# Patient Record
Sex: MALE | Race: WHITE | NOT HISPANIC OR LATINO | Employment: OTHER
[De-identification: names, ages, dates, MRNs, and addresses within clinical notes are randomized per-mention and may not be internally consistent; named-entity substitution may affect disease eponyms.]

---

## 2023-01-01 ENCOUNTER — HEALTH MAINTENANCE LETTER (OUTPATIENT)
Age: 88
End: 2023-01-01

## 2023-01-01 ENCOUNTER — DOCUMENTATION ONLY (OUTPATIENT)
Dept: OTHER | Facility: CLINIC | Age: 88
End: 2023-01-01
Payer: MEDICARE

## 2023-01-01 ENCOUNTER — ASSISTED LIVING VISIT (OUTPATIENT)
Dept: GERIATRICS | Facility: CLINIC | Age: 88
End: 2023-01-01
Payer: MEDICARE

## 2023-01-01 ENCOUNTER — OFFICE VISIT (OUTPATIENT)
Dept: AUDIOLOGY | Facility: CLINIC | Age: 88
End: 2023-01-01
Attending: NURSE PRACTITIONER
Payer: MEDICARE

## 2023-01-01 ENCOUNTER — LAB REQUISITION (OUTPATIENT)
Dept: LAB | Facility: CLINIC | Age: 88
End: 2023-01-01
Payer: MEDICARE

## 2023-01-01 VITALS
WEIGHT: 195 LBS | HEART RATE: 65 BPM | SYSTOLIC BLOOD PRESSURE: 130 MMHG | OXYGEN SATURATION: 97 % | DIASTOLIC BLOOD PRESSURE: 75 MMHG | RESPIRATION RATE: 20 BRPM | TEMPERATURE: 98 F

## 2023-01-01 VITALS
SYSTOLIC BLOOD PRESSURE: 163 MMHG | TEMPERATURE: 98.4 F | HEART RATE: 87 BPM | BODY MASS INDEX: 25.84 KG/M2 | OXYGEN SATURATION: 97 % | RESPIRATION RATE: 16 BRPM | DIASTOLIC BLOOD PRESSURE: 72 MMHG | HEIGHT: 73 IN | WEIGHT: 195 LBS

## 2023-01-01 DIAGNOSIS — R53.81 PHYSICAL DECONDITIONING: ICD-10-CM

## 2023-01-01 DIAGNOSIS — R53.1 WEAKNESS: ICD-10-CM

## 2023-01-01 DIAGNOSIS — N40.0 BENIGN PROSTATIC HYPERPLASIA WITHOUT URINARY OBSTRUCTION: ICD-10-CM

## 2023-01-01 DIAGNOSIS — E11.9 TYPE 2 DIABETES MELLITUS WITHOUT COMPLICATION, WITHOUT LONG-TERM CURRENT USE OF INSULIN (H): ICD-10-CM

## 2023-01-01 DIAGNOSIS — F03.B0 MODERATE DEMENTIA WITHOUT BEHAVIORAL DISTURBANCE, PSYCHOTIC DISTURBANCE, MOOD DISTURBANCE, OR ANXIETY, UNSPECIFIED DEMENTIA TYPE (H): Primary | ICD-10-CM

## 2023-01-01 DIAGNOSIS — C44.42 SQUAMOUS CELL CARCINOMA OF SCALP: ICD-10-CM

## 2023-01-01 DIAGNOSIS — H90.3 SENSORINEURAL HEARING LOSS (SNHL) OF BOTH EARS: Primary | ICD-10-CM

## 2023-01-01 DIAGNOSIS — D69.6 THROMBOCYTOPENIA (H): ICD-10-CM

## 2023-01-01 DIAGNOSIS — E11.9 TYPE 2 DIABETES MELLITUS WITHOUT COMPLICATIONS (H): ICD-10-CM

## 2023-01-01 DIAGNOSIS — R41.89 COGNITIVE IMPAIRMENT: ICD-10-CM

## 2023-01-01 DIAGNOSIS — Z86.39 PERSONAL HISTORY OF OTHER ENDOCRINE, NUTRITIONAL AND METABOLIC DISEASE: ICD-10-CM

## 2023-01-01 DIAGNOSIS — Z23 NEED FOR PNEUMOCOCCAL VACCINE: ICD-10-CM

## 2023-01-01 DIAGNOSIS — C44.42 SQUAMOUS CELL CARCINOMA OF SCALP: Primary | ICD-10-CM

## 2023-01-01 DIAGNOSIS — I10 PRIMARY HYPERTENSION: ICD-10-CM

## 2023-01-01 DIAGNOSIS — H91.93 BILATERAL HEARING LOSS, UNSPECIFIED HEARING LOSS TYPE: ICD-10-CM

## 2023-01-01 LAB
ANION GAP SERPL CALCULATED.3IONS-SCNC: 13 MMOL/L (ref 7–15)
BASOPHILS # BLD AUTO: 0.1 10E3/UL (ref 0–0.2)
BASOPHILS NFR BLD AUTO: 1 %
BUN SERPL-MCNC: 20.6 MG/DL (ref 8–23)
CALCIUM SERPL-MCNC: 9.1 MG/DL (ref 8.2–9.6)
CHLORIDE SERPL-SCNC: 102 MMOL/L (ref 98–107)
CREAT SERPL-MCNC: 1.06 MG/DL (ref 0.67–1.17)
DEPRECATED HCO3 PLAS-SCNC: 24 MMOL/L (ref 22–29)
EGFRCR SERPLBLD CKD-EPI 2021: 67 ML/MIN/1.73M2
EOSINOPHIL # BLD AUTO: 0.4 10E3/UL (ref 0–0.7)
EOSINOPHIL NFR BLD AUTO: 4 %
ERYTHROCYTE [DISTWIDTH] IN BLOOD BY AUTOMATED COUNT: 14.5 % (ref 10–15)
GLUCOSE SERPL-MCNC: 212 MG/DL (ref 70–99)
HBA1C MFR BLD: 7.7 %
HCT VFR BLD AUTO: 35 % (ref 40–53)
HGB BLD-MCNC: 11.6 G/DL (ref 13.3–17.7)
IMM GRANULOCYTES # BLD: 0.1 10E3/UL
IMM GRANULOCYTES NFR BLD: 1 %
LYMPHOCYTES # BLD AUTO: 3.5 10E3/UL (ref 0.8–5.3)
LYMPHOCYTES NFR BLD AUTO: 40 %
MCH RBC QN AUTO: 33 PG (ref 26.5–33)
MCHC RBC AUTO-ENTMCNC: 33.1 G/DL (ref 31.5–36.5)
MCV RBC AUTO: 99 FL (ref 78–100)
MONOCYTES # BLD AUTO: 0.8 10E3/UL (ref 0–1.3)
MONOCYTES NFR BLD AUTO: 9 %
NEUTROPHILS # BLD AUTO: 3.8 10E3/UL (ref 1.6–8.3)
NEUTROPHILS NFR BLD AUTO: 45 %
NRBC # BLD AUTO: 0 10E3/UL
NRBC BLD AUTO-RTO: 0 /100
PLATELET # BLD AUTO: 139 10E3/UL (ref 150–450)
POTASSIUM SERPL-SCNC: 4.2 MMOL/L (ref 3.4–5.3)
RBC # BLD AUTO: 3.52 10E6/UL (ref 4.4–5.9)
SODIUM SERPL-SCNC: 139 MMOL/L (ref 135–145)
WBC # BLD AUTO: 8.6 10E3/UL (ref 4–11)

## 2023-01-01 PROCEDURE — 83036 HEMOGLOBIN GLYCOSYLATED A1C: CPT | Mod: ORL | Performed by: NURSE PRACTITIONER

## 2023-01-01 PROCEDURE — 92567 TYMPANOMETRY: CPT | Performed by: AUDIOLOGIST

## 2023-01-01 PROCEDURE — 36415 COLL VENOUS BLD VENIPUNCTURE: CPT | Mod: ORL | Performed by: NURSE PRACTITIONER

## 2023-01-01 PROCEDURE — 80048 BASIC METABOLIC PNL TOTAL CA: CPT | Mod: ORL | Performed by: NURSE PRACTITIONER

## 2023-01-01 PROCEDURE — 99349 HOME/RES VST EST MOD MDM 40: CPT | Performed by: NURSE PRACTITIONER

## 2023-01-01 PROCEDURE — 85025 COMPLETE CBC W/AUTO DIFF WBC: CPT | Mod: ORL | Performed by: NURSE PRACTITIONER

## 2023-01-01 PROCEDURE — P9604 ONE-WAY ALLOW PRORATED TRIP: HCPCS | Mod: ORL | Performed by: NURSE PRACTITIONER

## 2023-01-01 PROCEDURE — 92557 COMPREHENSIVE HEARING TEST: CPT | Performed by: AUDIOLOGIST

## 2023-01-01 RX ORDER — IBUPROFEN 400 MG/1
400 TABLET, FILM COATED ORAL EVERY 6 HOURS PRN
Status: ON HOLD | COMMUNITY
End: 2024-01-01

## 2023-01-01 RX ORDER — NIACINAMIDE 500 MG
500 TABLET ORAL 2 TIMES DAILY
Status: ON HOLD | COMMUNITY
End: 2024-01-01

## 2023-05-17 ENCOUNTER — DOCUMENTATION ONLY (OUTPATIENT)
Dept: OTHER | Facility: CLINIC | Age: 88
End: 2023-05-17

## 2023-05-24 ENCOUNTER — DOCUMENTATION ONLY (OUTPATIENT)
Dept: GERIATRICS | Facility: CLINIC | Age: 88
End: 2023-05-24

## 2023-05-24 DIAGNOSIS — H91.93 BILATERAL HEARING LOSS, UNSPECIFIED HEARING LOSS TYPE: Primary | ICD-10-CM

## 2023-05-24 PROBLEM — Z91.81 HISTORY OF FALLING: Status: ACTIVE | Noted: 2020-11-02

## 2023-05-24 PROBLEM — Z85.828 HISTORY OF SQUAMOUS CELL CARCINOMA OF SKIN: Status: ACTIVE | Noted: 2018-11-06

## 2023-05-24 NOTE — PROGRESS NOTES
[facility]   May 24, 2023     Name: Nick Cameron   : 1933     Background:  Tammy (daughter) reports patient has chronic hearing loss, requesting order for Audiology referral.       Orders:    Referral for Audiologist  Dx bilateral hearing loss        Electronically signed by  EDIE Sibley CNP on 2023 at 2:59 PM

## 2023-05-31 ENCOUNTER — ASSISTED LIVING VISIT (OUTPATIENT)
Dept: GERIATRICS | Facility: CLINIC | Age: 88
End: 2023-05-31
Payer: MEDICARE

## 2023-05-31 VITALS
HEART RATE: 72 BPM | SYSTOLIC BLOOD PRESSURE: 126 MMHG | WEIGHT: 195 LBS | DIASTOLIC BLOOD PRESSURE: 65 MMHG | OXYGEN SATURATION: 96 % | RESPIRATION RATE: 16 BRPM | TEMPERATURE: 98 F

## 2023-05-31 DIAGNOSIS — D04.9 SQUAMOUS CELL CARCINOMA IN SITU (SCCIS) OF SKIN: ICD-10-CM

## 2023-05-31 DIAGNOSIS — E11.9 TYPE 2 DIABETES MELLITUS WITHOUT COMPLICATION, WITHOUT LONG-TERM CURRENT USE OF INSULIN (H): Primary | ICD-10-CM

## 2023-05-31 DIAGNOSIS — N40.0 BENIGN PROSTATIC HYPERPLASIA WITHOUT URINARY OBSTRUCTION: ICD-10-CM

## 2023-05-31 DIAGNOSIS — I10 PRIMARY HYPERTENSION: ICD-10-CM

## 2023-05-31 DIAGNOSIS — R53.81 PHYSICAL DECONDITIONING: ICD-10-CM

## 2023-05-31 DIAGNOSIS — R41.89 COGNITIVE IMPAIRMENT: ICD-10-CM

## 2023-05-31 DIAGNOSIS — L98.9 SCALP LESION: ICD-10-CM

## 2023-05-31 PROCEDURE — 99344 HOME/RES VST NEW MOD MDM 60: CPT | Performed by: NURSE PRACTITIONER

## 2023-05-31 NOTE — LETTER
5/31/2023        RE: Nick Cameron  [place] [facility]  [address]  [place] [address]        [facility] GERIATRICS    PRIMARY CARE PROVIDER AND CLINIC:  EDIE Sibley CNP, [address] / [place] [address]  Chief Complaint   Patient presents with     [facility] Medical Record Number:  [id number]  Place of Service where encounter took place:  [facility] ([facility]) [[id number]]    Nick Cameron  is a 89 year old  (7/30/1933), living in above facility since 5/12/23 and now choosing to change PCPs to [facility].     HPI:      PMH: diabetes, HTN, cognitive impairment, physical deconditioning, hx SCC to scalp s/p Moh's procedure    Resides in [facility] memory care unit w/spouse.       Today's concerns:    During exam, patient seen sitting in recliner in memory care apartment w/spouse present. Reports doing well, denies pain. States he's been dealing with scalp lesion for multiple years, chronic wound. Wears a cap to cover dressing to keep in place. States staff and daughter assisting with wound care. Primarily wheelchair bound, ambulates w/walker in apartment. Admits to good appetite. Sleeping well at night. Denies issues with bowel or bladder. Denies chest pain, SOB, headache, syncope.      CODE STATUS/ADVANCE DIRECTIVES DISCUSSION:   DNR / DNI  ALLERGIES: No Known Allergies   PAST MEDICAL HISTORY:   Past Medical History:   Diagnosis Date     DM2 (diabetes mellitus, type 2) (H)      HLD (hyperlipidemia)      HTN (hypertension)      Malignant neoplasm of skin       PAST SURGICAL HISTORY:   has a past surgical history that includes Cataract Extraction W/ Intraocular Lens Implant; Mohs micrographic procedure; and Repair MOHS.  FAMILY HISTORY: family history includes Bone Cancer in his father; Hypertension in his father and mother; Rheumatic fever in his mother.  SOCIAL HISTORY:   reports that he has quit smoking. His smoking use included pipe and  cigars. He has never used smokeless tobacco. He reports current alcohol use.  Patient's living condition: lives in an assisted living facility memory care unit w/spouse    Post Discharge Medication Reconciliation Status:   MED REC REQUIRED  Post Medication Reconciliation Status: patient was not discharged from an inpatient facility or TCU     Current Outpatient Medications   Medication Sig     acetaminophen (MAPAP) 500 MG CAPS Take 1-2 capsules by mouth every 6 hours as needed     aspirin (ASPIRIN 81) 81 MG chewable tablet Take 81 mg by mouth daily     atorvastatin (LIPITOR) 20 MG tablet Take 20 mg by mouth daily     diltiazem ER (DILT-XR) 120 MG 24 hr capsule Take 120 mg by mouth daily     Emollient (CERAVE) CREA Externally apply topically 2 times daily     fluocinonide (LIDEX) 0.05 % external cream Apply topically 2 times daily as needed (rash)     ketoconazole (NIZORAL) 2 % external shampoo Apply topically three times a week     metFORMIN (GLUCOPHAGE XR) 500 MG 24 hr tablet Take 1,000 mg by mouth daily (with dinner)     No current facility-administered medications for this visit.       ROS:  10 point ROS of systems including Constitutional, Eyes, Respiratory, Cardiovascular, Gastroenterology, Genitourinary, Integumentary, Musculoskeletal, Psychiatric were all negative except for pertinent positives noted in my HPI.      Vitals:  /65   Pulse 72   Temp 98  F (36.7  C)   Resp 16   Wt 88.5 kg (195 lb)   SpO2 96%   Exam:  GENERAL APPEARANCE:  Alert, in no distress, appears healthy, oriented, cooperative  ENT:  Mouth and posterior oropharynx normal, moist mucous membranes, [place]  EYES:  EOM, conjunctivae, lids, pupils and irises normal, PERRL  RESP:  respiratory effort and palpation of chest normal, lungs clear to auscultation , no respiratory distress  CV:  Palpation and auscultation of heart done , regular rate and rhythm, no murmur, rub, or gallop, no edema  ABDOMEN:  normal bowel sounds, soft, nontender,  no guarding or rebound  M/S:   Gait and station abnormal, sitting in chair.  SKIN:  Inspection of skin and subcutaneous tissue baseline, Palpation of skin and subcutaneous tissue baseline. Scalp wound dressing CDI, wearing cap to cover dressing  NEURO:   Cranial nerves 2-12 are normal tested and grossly at patient's baseline, Examination of sensation by touch normal  PSYCH:  Oriented x 2, memory impaired , affect and mood normal      Lab/Diagnostic data:  Recent labs in [facility] reviewed by me today.                ASSESSMENT/PLAN:    (E11.9) Type 2 diabetes mellitus without complication, without long-term current use of insulin (H)  (primary encounter diagnosis)  Comment: Controlled, last A1c 6.1% on 4/10/23. A1c goal 8% for older adults.  Plan:   - Recheck A1c  - Continue metformin, consider decreasing metformin if A1c < 7%    (I10) Primary hypertension  Comment: Controlled  Plan:   - Continue ASA, lipitor, diltiazem  - Monitor BP/HR during routine visits    (N40.0) Benign prostatic hyperplasia without urinary obstruction  Comment: Chronic BPH. Hx taking flomax, unclear why this was discontinued.   Plan:   - Monitor symptoms    (R53.81) Physical deconditioning  (R41.89) Cognitive impairment  Comment: Ongoing physical deconditioning w/cognitive impairment. Primarily wheelchair bound, ambulates short distances w/walker.   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   Plan:   - Continue wound care as directed by Dermatology  - Patient to follow-up with Dermatologist as directed  UPDATE: Reviewed patient status and treatment plan with Tammy (daughter)       Orders:  - Check CBC, CMP, A1c, TSH, free T4, free T3 on 6/28/23 dx fatigue, HTN,  diabetes      Electronically signed by:  EDIE Sibley CNP                      [facility]   2023     Name: Nick Cameron   : 1933       Orders:  - Check CBC, CMP, A1c, TSH, free T4, free T3 on 23 dx fatigue, HTN, diabetes      Electronically signed by  EDIE Sibley CNP on 2023 at 12:50 PM              Sincerely,        EDIE Sibley CNP

## 2023-05-31 NOTE — PROGRESS NOTES
[facility] GERIATRICS    PRIMARY CARE PROVIDER AND CLINIC:  EDIE Sibley CNP, [address] [facility] [address]  Chief Complaint   Patient presents with     [facility] Medical Record Number:  [id number]  Place of Service where encounter took place:  [facility] ([facility]) [[id number]]    Nick Cameron  is a 89 year old  (7/30/1933), living in above facility since 5/12/23 and now choosing to change PCPs to [facility].     HPI:      PMH: diabetes, HTN, cognitive impairment, physical deconditioning, hx SCC to scalp s/p Moh's procedure    Resides in [facility] care unit w/spouse.       Today's concerns:    During exam, patient seen sitting in recliner in [facility] care apartment w/spouse present. Reports doing well, denies pain. States he's been dealing with scalp lesion for multiple years, chronic wound. Wears a cap to cover dressing to keep in place. States staff and daughter assisting with wound care. Primarily wheelchair bound, ambulates w/walker in apartment. Admits to good appetite. Sleeping well at night. Denies issues with bowel or bladder. Denies chest pain, SOB, headache, syncope.      CODE STATUS/ADVANCE DIRECTIVES DISCUSSION:   DNR / DNI  ALLERGIES: No Known Allergies   PAST MEDICAL HISTORY:   Past Medical History:   Diagnosis Date     DM2 (diabetes mellitus, type 2) (H)      HLD (hyperlipidemia)      HTN (hypertension)      Malignant neoplasm of skin       PAST SURGICAL HISTORY:   has a past surgical history that includes Cataract Extraction W/ Intraocular Lens Implant; Mohs micrographic procedure; and Repair MOHS.  FAMILY HISTORY: family history includes Bone Cancer in his father; Hypertension in his father and mother; Rheumatic fever in his mother.  SOCIAL HISTORY:   reports that he has quit smoking. His smoking use included pipe and cigars. He has never used smokeless tobacco. He reports current alcohol use.  Patient's living condition:  lives in an assisted living facility memory care unit w/spouse    Post Discharge Medication Reconciliation Status:   MED REC REQUIRED  Post Medication Reconciliation Status: patient was not discharged from an inpatient facility or TCU     Current Outpatient Medications   Medication Sig     acetaminophen (MAPAP) 500 MG CAPS Take 1-2 capsules by mouth every 6 hours as needed     aspirin (ASPIRIN 81) 81 MG chewable tablet Take 81 mg by mouth daily     atorvastatin (LIPITOR) 20 MG tablet Take 20 mg by mouth daily     diltiazem ER (DILT-XR) 120 MG 24 hr capsule Take 120 mg by mouth daily     Emollient (CERAVE) CREA Externally apply topically 2 times daily     fluocinonide (LIDEX) 0.05 % external cream Apply topically 2 times daily as needed (rash)     ketoconazole (NIZORAL) 2 % external shampoo Apply topically three times a week     metFORMIN (GLUCOPHAGE XR) 500 MG 24 hr tablet Take 1,000 mg by mouth daily (with dinner)     No current facility-administered medications for this visit.       ROS:  10 point ROS of systems including Constitutional, Eyes, Respiratory, Cardiovascular, Gastroenterology, Genitourinary, Integumentary, Musculoskeletal, Psychiatric were all negative except for pertinent positives noted in my HPI.      Vitals:  /65   Pulse 72   Temp 98  F (36.7  C)   Resp 16   Wt 88.5 kg (195 lb)   SpO2 96%   Exam:  GENERAL APPEARANCE:  Alert, in no distress, appears healthy, oriented, cooperative  ENT:  Mouth and posterior oropharynx normal, moist mucous membranes, [place]  EYES:  EOM, conjunctivae, lids, pupils and irises normal, PERRL  RESP:  respiratory effort and palpation of chest normal, lungs clear to auscultation , no respiratory distress  CV:  Palpation and auscultation of heart done , regular rate and rhythm, no murmur, rub, or gallop, no edema  ABDOMEN:  normal bowel sounds, soft, nontender, no guarding or rebound  M/S:   Gait and station abnormal, sitting in chair.  SKIN:  Inspection of skin and  subcutaneous tissue baseline, Palpation of skin and subcutaneous tissue baseline. Scalp wound dressing CDI, wearing cap to cover dressing  NEURO:   Cranial nerves 2-12 are normal tested and grossly at patient's baseline, Examination of sensation by touch normal  PSYCH:  Oriented x 2, memory impaired , affect and mood normal      Lab/Diagnostic data:  Recent labs in [facility] reviewed by me today.                ASSESSMENT/PLAN:    (E11.9) Type 2 diabetes mellitus without complication, without long-term current use of insulin (H)  (primary encounter diagnosis)  Comment: Controlled, last A1c 6.1% on 4/10/23. A1c goal 8% for older adults.  Plan:   - Recheck A1c  - Continue metformin, consider decreasing metformin if A1c < 7%    (I10) Primary hypertension  Comment: Controlled  Plan:   - Continue ASA, lipitor, diltiazem  - Monitor BP/HR during routine visits    (N40.0) Benign prostatic hyperplasia without urinary obstruction  Comment: Chronic BPH. Hx taking flomax, unclear why this was discontinued.   Plan:   - Monitor symptoms    (R53.81) Physical deconditioning  (R41.89) Cognitive impairment  Comment: Ongoing physical deconditioning w/cognitive impairment. Primarily wheelchair bound, ambulates short distances w/walker.   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   Plan:   - Continue wound care as directed by Dermatology  - Patient to follow-up with Dermatologist as directed  UPDATE: Reviewed patient status and treatment plan with Tammy (daughter)       Orders:  - Check CBC, CMP, A1c, TSH, free T4, free T3 on 6/28/23 dx fatigue, HTN, diabetes      Electronically signed by:  EDIE Sibley CNP

## 2023-06-16 NOTE — PROGRESS NOTES
[facility]   2023     Name: Nick Cameron   : 1933       Orders:  - Check CBC, CMP, A1c, TSH, free T4, free T3 on 23 dx fatigue, HTN, diabetes      Electronically signed by  EDIE Sibley CNP on 2023 at 12:50 PM

## 2023-06-23 ENCOUNTER — ASSISTED LIVING VISIT (OUTPATIENT)
Dept: GERIATRICS | Facility: CLINIC | Age: 88
End: 2023-06-23
Payer: MEDICARE

## 2023-06-23 VITALS
RESPIRATION RATE: 20 BRPM | TEMPERATURE: 97.9 F | WEIGHT: 195 LBS | SYSTOLIC BLOOD PRESSURE: 115 MMHG | OXYGEN SATURATION: 95 % | DIASTOLIC BLOOD PRESSURE: 63 MMHG | HEART RATE: 71 BPM

## 2023-06-23 DIAGNOSIS — Z51.81 MEDICATION MONITORING ENCOUNTER: ICD-10-CM

## 2023-06-23 DIAGNOSIS — M1A.00X0 CHRONIC GOUTY ARTHRITIS: ICD-10-CM

## 2023-06-23 DIAGNOSIS — Z91.81 HISTORY OF FALLING: ICD-10-CM

## 2023-06-23 DIAGNOSIS — Z85.828 HISTORY OF SKIN CANCER: ICD-10-CM

## 2023-06-23 DIAGNOSIS — I10 PRIMARY HYPERTENSION: ICD-10-CM

## 2023-06-23 DIAGNOSIS — E78.5 HYPERLIPIDEMIA, UNSPECIFIED HYPERLIPIDEMIA TYPE: ICD-10-CM

## 2023-06-23 DIAGNOSIS — E11.9 TYPE 2 DIABETES MELLITUS WITHOUT COMPLICATION, WITHOUT LONG-TERM CURRENT USE OF INSULIN (H): ICD-10-CM

## 2023-06-23 DIAGNOSIS — N40.0 BENIGN PROSTATIC HYPERPLASIA WITHOUT URINARY OBSTRUCTION: ICD-10-CM

## 2023-06-23 DIAGNOSIS — R41.89 COGNITIVE IMPAIRMENT: Primary | ICD-10-CM

## 2023-06-23 PROCEDURE — 99349 HOME/RES VST EST MOD MDM 40: CPT | Performed by: FAMILY MEDICINE

## 2023-06-23 NOTE — LETTER
6/23/2023        RE: [facility]  [address]  [place] [address]        [facility]  Geriatric Services    PRIMARY CARE PROVIDER AND CLINIC:      Luann Rogers [address] [facility] [address]    Vic Padilla MD FAAFP     SUBJECTIVE    Chief Complaint   Patient presents with     [facility] Care     Nick Cameron is a 89 year old  (7/30/1933),resident of    [facility] [place] [address]    Initial/Episodic Care:    Current issues are:        Meet with patient and his wife, past history reviewed, notes hx of skin cancer and hearing loss    DM    Lab Results   Component Value Date    A1C 6.5 06/28/2023     Htn    BP Readings from Last 3 Encounters:   06/23/23 115/63   05/31/23 126/65     Lipids    No results for input(s): CHOL, HDL, LDL, TRIG, CHOLHDLRATIO in the last 42958 hours.    Patient's living condition: lives with spouse    Medications reviewed and reconciled:  yes  Medications taken as prescribed:  yes  Medications side effects noted:  None known    Current Activity/ADL Level:  At baseline, uses walker, wheelchair    Any fall's in last year ?  rare    Advance Directives ?  See POLST - pending     Appetite:  Very good, B/L/D  Sleep: good  Bowels:  No issues  Bladder:  Incontinence noted  Incontinence:  urine    Immunizations:  reviewed    Health Maintenance    Health Maintenance Due   Topic Date Due     LIPID  Never done     MICROALBUMIN  Never done     DIABETIC FOOT EXAM  Never done     ANNUAL REVIEW OF  ORDERS  Never done     EYE EXAM  Never done     FALL RISK ASSESSMENT  Never done     MEDICARE ANNUAL WELLNESS VISIT  10/19/2019     PHQ-2 (once per calendar year)  Never done     COVID-19 Vaccine (6 - Pfizer series) 03/02/2023       Current Problem List    Patient Active Problem List   Diagnosis     Benign prostatic hyperplasia without urinary obstruction     Chronic gouty arthritis     Diabetes mellitus, type 2  (H)     History of falling     History of squamous cell carcinoma of skin     Hyperlipidemia     Primary hypertension     Senile hyperkeratosis     Squamous cell carcinoma in situ (SCCIS) of skin       Past Medical History    Past Medical History:   Diagnosis Date     BPH (benign prostatic hyperplasia)      DM2 (diabetes mellitus, type 2) (H)      Gout      HLD (hyperlipidemia)      HTN (hypertension)      Malignant neoplasm of skin     [place]       Past Surgical History    Past Surgical History:   Procedure Laterality Date     CATARACT EXTRACTION W/ INTRAOCULAR LENS IMPLANT       MOHS MICROGRAPHIC PROCEDURE       REPAIR MOHS         Current Medications    Current Outpatient Medications   Medication Sig Dispense Refill     acetaminophen (MAPAP) 500 MG CAPS Take 1-2 capsules by mouth every 6 hours as needed       aspirin (ASPIRIN 81) 81 MG chewable tablet Take 81 mg by mouth daily       atorvastatin (LIPITOR) 20 MG tablet Take 20 mg by mouth daily       diltiazem ER (DILT-XR) 120 MG 24 hr capsule Take 120 mg by mouth daily       Emollient (CERAVE) CREA Externally apply topically 2 times daily       fluocinonide (LIDEX) 0.05 % external cream Apply topically 2 times daily as needed (rash)       ketoconazole (NIZORAL) 2 % external shampoo Apply topically three times a week       metFORMIN (GLUCOPHAGE XR) 500 MG 24 hr tablet Take 1,000 mg by mouth daily (with dinner)         Allergies    No Known Allergies    Immunizations    Immunization History   Administered Date(s) Administered     COVID-19 Bivalent 18+ (Moderna) 11/02/2022     COVID-19 MONOVALENT 12+ (Pfizer) 01/19/2021, 02/09/2021, 11/05/2021     COVID-19 Monovalent 18+ (Moderna) 04/27/2022     FLU 6-35 months 11/01/2007, 09/25/2009, 09/28/2010     FLUAD(HD)65+ QUAD 11/02/2022     Flu, Unspecified 10/28/2008     Influenza (High Dose) 3 valent vaccine 10/15/2011, 10/27/2012, 11/04/2013, 10/25/2015, 11/04/2017, 10/19/2018, 10/29/2019     Influenza (IIV3) PF 12/30/2002,  11/01/2006, 10/28/2008, 10/18/2011, 10/15/2014     Influenza Vaccine 65+ (Fluzone HD) 10/14/2020, 11/05/2021     Influenza Vaccine IM Ages 6-35 Months 4 Valent (PF) 11/01/2006, 11/01/2007, 10/17/2012     Pneumo Conj 13-V (2010&after) 10/25/2015     Pneumococcal 23 valent 04/28/1997, 10/19/2018     TD,PF 7+ (Tenivac) 05/02/2007, 10/19/2018     TDAP (Adacel,Boostrix) 05/02/2007     Td (Adult), Adsorbed 04/28/1997, 05/02/2007     Tdap (Adult) Unspecified Formulation 04/28/1997     Zoster recombinant adjuvanted (SHINGRIX) 11/08/2019, 10/14/2020       Family History    Family History   Problem Relation Age of Onset     Hypertension Mother      Rheumatic fever Mother      Bone Cancer Father      Hypertension Father        Social History    Social History     Socioeconomic History     Marital status:      Spouse name: Not on file     Number of children: Not on file     Years of education: Not on file     Highest education level: Not on file   Occupational History     Not on file   Tobacco Use     Smoking status: Former     Types: Pipe, Cigars     Smokeless tobacco: Never     Tobacco comments:     Quit 1970 to 1980   Substance and Sexual Activity     Alcohol use: Yes     Comment: Limited     Drug use: Not on file     Sexual activity: Not on file   Other Topics Concern     Not on file   Social History Narrative     Not on file     Social Determinants of Health     Financial Resource Strain: Not on file   Food Insecurity: Not on file   Transportation Needs: Not on file   Physical Activity: Not on file   Stress: Not on file   Social Connections: Not on file   Intimate Partner Violence: Not on file   Housing Stability: Not on file       All above reviewed and updated, all stable unless otherwise noted    Recent labs reviewed    ROS    As above, otherwise negative    CONSTITUTIONAL: NEGATIVE for fever, chills, change in weight  INTEGUMENTARY/SKIN: NEGATIVE for worrisome rashes, moles or lesions  EYES: NEGATIVE for vision  changes or irritation  ENT/MOUTH: NEGATIVE for ear, mouth and throat problems  RESP: NEGATIVE for significant cough or SOB  CV: NEGATIVE for chest pain, palpitations or peripheral edema  GI: NEGATIVE for nausea, abdominal pain, heartburn, or change in bowel habits  : NEGATIVE for frequency, dysuria, or hematuria  MUSCULOSKELETAL: NEGATIVE for significant arthralgias or myalgia  NEURO: NEGATIVE for weakness, dizziness or paresthesias  ENDOCRINE: NEGATIVE for temperature intolerance, skin/hair changes  HEME: NEGATIVE for bleeding problems  PSYCHIATRIC: NEGATIVE for changes in mood or affect    OBJECTIVE    /63   Pulse 71   Temp 97.9  F (36.6  C)   Resp 20   Wt 88.5 kg (195 lb)   SpO2 95%   There is no height or weight on file to calculate BMI.    BP Readings from Last 3 Encounters:   06/23/23 115/63   05/31/23 126/65       Wt Readings from Last 4 Encounters:   06/23/23 88.5 kg (195 lb)   05/31/23 88.5 kg (195 lb)       GENERAL: healthy, alert and no distress  EYES: Eyes grossly normal to inspection, extraocular movements - intact, and PERRL  NECK: no tenderness, no adenopathy, no asymmetry, no masses, no stiffness; thyroid- normal to palpation  RESP: lungs clear to auscultation - no rales, no rhonchi, no wheezes  CV: regular rates and rhythm, normal S1 S2, no S3 or S4 and no murmur, no click or rub -  ABDOMEN: soft, no tenderness, no  hepatosplenomegaly, no masses, normal bowel sounds  MS: extremities- no gross deformities noted, no edema  SKIN: no suspicious lesions, no rashes  NEURO: decreased memory, non focal  PSYCH: affect normal/bright    DIAGNOSTICS/PROCEDURES    Lab/Diagnostic data:    WBC Count   Date Value Ref Range Status   06/28/2023 6.6 4.0 - 11.0 10e3/uL Final   ]    Hemoglobin   Date Value Ref Range Status   06/28/2023 12.1 (L) 13.3 - 17.7 g/dL Final   ]    Last Basic Metabolic Panel:    Lab Results   Component Value Date     06/28/2023      Lab Results   Component Value Date     POTASSIUM 4.0 06/28/2023     Lab Results   Component Value Date    CURT 9.5 06/28/2023     Lab Results   Component Value Date    CO2 26 06/28/2023       Lab Results   Component Value Date    BUN 23.5 06/28/2023     Lab Results   Component Value Date    CR 1.06 06/28/2023       Lab Results   Component Value Date     06/28/2023       Recent labs reviewed     ASSESSMENT      ICD-10-CM    1. Cognitive impairment  R41.89       2. Type 2 diabetes mellitus without complication, without long-term current use of insulin (H)  E11.9       3. Primary hypertension  I10       4. Hyperlipidemia, unspecified hyperlipidemia type  E78.5       5. History of skin cancer  Z85.828       6. History of falling  Z91.81       7. Benign prostatic hyperplasia without urinary obstruction  N40.0       8. Chronic gouty arthritis  M1A.00X0       9. Medication monitoring encounter  Z51.81           PLAN    Discussed treatment/modality options, including risk and benefits, he desires:     Orders:    Continue current cares  Recent labs reviewed    All diagnosis above reviewed and noted above, otherwise stable.  See [email or web address] orders for further details.      Information reviewed:  Medications, vital signs, orders, nursing notes, problem list, hospital information.     Facility MDS and care plan reviewed.    Total time spent with patient visit was 40 minutes including patient visit and review of past records. Greater than 50% of total time spent with counseling and coordinating care.    Follow up in 4-6 month(s) and as needed.    FACE TO FACE    Documentation of Face to Face and Certification for Home Health Services    I certify that patient: Nick Cameron is under my care and that I, or a nurse practitioner or physician's assistant working with me, had a face-to-face encounter that meets the physician face-to-face encounter requirements with this patient on: 6/23/2023.    This encounter with the patient was in whole, or in part, for the  following medical condition, which is the primary reason for home health care: see above.    I certify that, based on my findings, the following services are medically necessary home health services: per memory care.    My clinical findings support the need for the above services because: see above    Further, I certify that my clinical findings support that this patient is homebound (i.e. absences from home require considerable and taxing effort and are for medical reasons or [employer] services or infrequently or of short duration when for other reasons) because: fall and safety concerns.    Based on the above findings. I certify that this patient is confined to the home and needs intermittent skilled nursing care, physical therapy and/or speech therapy.  The patient is under my care, and I have initiated the establishment of the plan of care.  This patient will be followed by a physician who will periodically review the plan of care.  Physician/Provider to provide follow up care: Luann Rogers    Attending hospital physician (the Medicare certified [place] provider): Vic Padilla MD  Physician Signature: See electronic signature associated with these discharge orders.  Date: 7/18/2023           Vic Padilla MD, FAA[facility]. [facility]  [address] [place] [id number]  [email or web address]   Office: [phone]  Fax: [phone]  Pager: [phone]                   Sincerely,        Vic Padilla MD

## 2023-06-26 ENCOUNTER — LAB REQUISITION (OUTPATIENT)
Dept: LAB | Facility: CLINIC | Age: 88
End: 2023-06-26
Payer: MEDICARE

## 2023-06-26 DIAGNOSIS — E11.9 TYPE 2 DIABETES MELLITUS WITHOUT COMPLICATIONS (H): ICD-10-CM

## 2023-06-26 DIAGNOSIS — R53.1 WEAKNESS: ICD-10-CM

## 2023-06-26 DIAGNOSIS — R42 DIZZINESS AND GIDDINESS: ICD-10-CM

## 2023-06-28 LAB
ALBUMIN SERPL BCG-MCNC: 3.8 G/DL (ref 3.5–5.2)
ALP SERPL-CCNC: 109 U/L (ref 40–129)
ALT SERPL W P-5'-P-CCNC: 15 U/L (ref 0–70)
ANION GAP SERPL CALCULATED.3IONS-SCNC: 12 MMOL/L (ref 7–15)
AST SERPL W P-5'-P-CCNC: 25 U/L (ref 0–45)
BASOPHILS # BLD AUTO: 0 10E3/UL (ref 0–0.2)
BASOPHILS NFR BLD AUTO: 1 %
BILIRUB SERPL-MCNC: 0.9 MG/DL
BUN SERPL-MCNC: 23.5 MG/DL (ref 8–23)
CALCIUM SERPL-MCNC: 9.5 MG/DL (ref 8.8–10.2)
CHLORIDE SERPL-SCNC: 106 MMOL/L (ref 98–107)
CREAT SERPL-MCNC: 1.06 MG/DL (ref 0.67–1.17)
DEPRECATED HCO3 PLAS-SCNC: 26 MMOL/L (ref 22–29)
EOSINOPHIL # BLD AUTO: 0.5 10E3/UL (ref 0–0.7)
EOSINOPHIL NFR BLD AUTO: 7 %
ERYTHROCYTE [DISTWIDTH] IN BLOOD BY AUTOMATED COUNT: 14.1 % (ref 10–15)
GFR SERPL CREATININE-BSD FRML MDRD: 67 ML/MIN/1.73M2
GLUCOSE SERPL-MCNC: 183 MG/DL (ref 70–99)
HBA1C MFR BLD: 6.5 %
HCT VFR BLD AUTO: 37 % (ref 40–53)
HGB BLD-MCNC: 12.1 G/DL (ref 13.3–17.7)
IMM GRANULOCYTES # BLD: 0 10E3/UL
IMM GRANULOCYTES NFR BLD: 0 %
LYMPHOCYTES # BLD AUTO: 2.9 10E3/UL (ref 0.8–5.3)
LYMPHOCYTES NFR BLD AUTO: 44 %
MCH RBC QN AUTO: 32 PG (ref 26.5–33)
MCHC RBC AUTO-ENTMCNC: 32.7 G/DL (ref 31.5–36.5)
MCV RBC AUTO: 98 FL (ref 78–100)
MONOCYTES # BLD AUTO: 0.6 10E3/UL (ref 0–1.3)
MONOCYTES NFR BLD AUTO: 9 %
NEUTROPHILS # BLD AUTO: 2.6 10E3/UL (ref 1.6–8.3)
NEUTROPHILS NFR BLD AUTO: 39 %
NRBC # BLD AUTO: 0 10E3/UL
NRBC BLD AUTO-RTO: 0 /100
PLATELET # BLD AUTO: 138 10E3/UL (ref 150–450)
POTASSIUM SERPL-SCNC: 4 MMOL/L (ref 3.4–5.3)
PROT SERPL-MCNC: 6.4 G/DL (ref 6.4–8.3)
RBC # BLD AUTO: 3.78 10E6/UL (ref 4.4–5.9)
SODIUM SERPL-SCNC: 144 MMOL/L (ref 136–145)
T3 SERPL-MCNC: 127 NG/DL (ref 85–202)
T4 FREE SERPL-MCNC: 1.21 NG/DL (ref 0.9–1.7)
TSH SERPL DL<=0.005 MIU/L-ACNC: 2.09 UIU/ML (ref 0.3–4.2)
WBC # BLD AUTO: 6.6 10E3/UL (ref 4–11)

## 2023-06-28 PROCEDURE — 36415 COLL VENOUS BLD VENIPUNCTURE: CPT | Mod: ORL | Performed by: NURSE PRACTITIONER

## 2023-06-28 PROCEDURE — 83036 HEMOGLOBIN GLYCOSYLATED A1C: CPT | Mod: ORL | Performed by: NURSE PRACTITIONER

## 2023-06-28 PROCEDURE — 80053 COMPREHEN METABOLIC PANEL: CPT | Mod: ORL | Performed by: NURSE PRACTITIONER

## 2023-06-28 PROCEDURE — 84480 ASSAY TRIIODOTHYRONINE (T3): CPT | Mod: ORL | Performed by: NURSE PRACTITIONER

## 2023-06-28 PROCEDURE — P9604 ONE-WAY ALLOW PRORATED TRIP: HCPCS | Mod: ORL | Performed by: NURSE PRACTITIONER

## 2023-06-28 PROCEDURE — 85025 COMPLETE CBC W/AUTO DIFF WBC: CPT | Mod: ORL | Performed by: NURSE PRACTITIONER

## 2023-06-28 PROCEDURE — 84443 ASSAY THYROID STIM HORMONE: CPT | Mod: ORL | Performed by: NURSE PRACTITIONER

## 2023-06-28 PROCEDURE — 84439 ASSAY OF FREE THYROXINE: CPT | Mod: ORL | Performed by: NURSE PRACTITIONER

## 2023-07-18 NOTE — PROGRESS NOTES
[facility]  Geriatric Services    PRIMARY CARE PROVIDER AND CLINIC:      Luann Rogers [address] [facility] [address]    Vic Padilla MD FAAFP     SUBJECTIVE    Chief Complaint   Patient presents with     [facility] Care     Nick Cameron is a 89 year old  (7/30/1933),resident of    [facility] [place] [address]    Initial/Episodic Care:    Current issues are:        Meet with patient and his wife, past history reviewed, notes hx of skin cancer and hearing loss    DM    Lab Results   Component Value Date    A1C 6.5 06/28/2023     Htn    BP Readings from Last 3 Encounters:   06/23/23 115/63   05/31/23 126/65     Lipids    No results for input(s): CHOL, HDL, LDL, TRIG, CHOLHDLRATIO in the last 52171 hours.    Patient's living condition: lives with spouse    Medications reviewed and reconciled:  yes  Medications taken as prescribed:  yes  Medications side effects noted:  None known    Current Activity/ADL Level:  At baseline, uses walker, wheelchair    Any fall's in last year ?  rare    Advance Directives ?  See POLST - pending     Appetite:  Very good, B/L/D  Sleep: good  Bowels:  No issues  Bladder:  Incontinence noted  Incontinence:  urine    Immunizations:  reviewed    Health Maintenance    Health Maintenance Due   Topic Date Due     LIPID  Never done     MICROALBUMIN  Never done     DIABETIC FOOT EXAM  Never done     ANNUAL REVIEW OF  ORDERS  Never done     EYE EXAM  Never done     FALL RISK ASSESSMENT  Never done     MEDICARE ANNUAL WELLNESS VISIT  10/19/2019     PHQ-2 (once per calendar year)  Never done     COVID-19 Vaccine (6 - Pfizer series) 03/02/2023       Current Problem List    Patient Active Problem List   Diagnosis     Benign prostatic hyperplasia without urinary obstruction     Chronic gouty arthritis     Diabetes mellitus, type 2 (H)     History of falling     History of squamous cell carcinoma of skin     Hyperlipidemia      Primary hypertension     Senile hyperkeratosis     Squamous cell carcinoma in situ (SCCIS) of skin       Past Medical History    Past Medical History:   Diagnosis Date     BPH (benign prostatic hyperplasia)      DM2 (diabetes mellitus, type 2) (H)      Gout      HLD (hyperlipidemia)      HTN (hypertension)      Malignant neoplasm of skin     [place]       Past Surgical History    Past Surgical History:   Procedure Laterality Date     CATARACT EXTRACTION W/ INTRAOCULAR LENS IMPLANT       MOHS MICROGRAPHIC PROCEDURE       REPAIR MOHS         Current Medications    Current Outpatient Medications   Medication Sig Dispense Refill     acetaminophen (MAPAP) 500 MG CAPS Take 1-2 capsules by mouth every 6 hours as needed       aspirin (ASPIRIN 81) 81 MG chewable tablet Take 81 mg by mouth daily       atorvastatin (LIPITOR) 20 MG tablet Take 20 mg by mouth daily       diltiazem ER (DILT-XR) 120 MG 24 hr capsule Take 120 mg by mouth daily       Emollient (CERAVE) CREA Externally apply topically 2 times daily       fluocinonide (LIDEX) 0.05 % external cream Apply topically 2 times daily as needed (rash)       ketoconazole (NIZORAL) 2 % external shampoo Apply topically three times a week       metFORMIN (GLUCOPHAGE XR) 500 MG 24 hr tablet Take 1,000 mg by mouth daily (with dinner)         Allergies    No Known Allergies    Immunizations    Immunization History   Administered Date(s) Administered     COVID-19 Bivalent 18+ (Moderna) 11/02/2022     COVID-19 MONOVALENT 12+ (Pfizer) 01/19/2021, 02/09/2021, 11/05/2021     COVID-19 Monovalent 18+ (Moderna) 04/27/2022     FLU 6-35 months 11/01/2007, 09/25/2009, 09/28/2010     FLUAD(HD)65+ QUAD 11/02/2022     Flu, Unspecified 10/28/2008     Influenza (High Dose) 3 valent vaccine 10/15/2011, 10/27/2012, 11/04/2013, 10/25/2015, 11/04/2017, 10/19/2018, 10/29/2019     Influenza (IIV3) PF 12/30/2002, 11/01/2006, 10/28/2008, 10/18/2011, 10/15/2014     Influenza Vaccine 65+ (Fluzone HD)  10/14/2020, 11/05/2021     Influenza Vaccine IM Ages 6-35 Months 4 Valent (PF) 11/01/2006, 11/01/2007, 10/17/2012     Pneumo Conj 13-V (2010&after) 10/25/2015     Pneumococcal 23 valent 04/28/1997, 10/19/2018     TD,PF 7+ (Tenivac) 05/02/2007, 10/19/2018     TDAP (Adacel,Boostrix) 05/02/2007     Td (Adult), Adsorbed 04/28/1997, 05/02/2007     Tdap (Adult) Unspecified Formulation 04/28/1997     Zoster recombinant adjuvanted (SHINGRIX) 11/08/2019, 10/14/2020       Family History    Family History   Problem Relation Age of Onset     Hypertension Mother      Rheumatic fever Mother      Bone Cancer Father      Hypertension Father        Social History    Social History     Socioeconomic History     Marital status:      Spouse name: Not on file     Number of children: Not on file     Years of education: Not on file     Highest education level: Not on file   Occupational History     Not on file   Tobacco Use     Smoking status: Former     Types: Pipe, Cigars     Smokeless tobacco: Never     Tobacco comments:     Quit 1970 to 1980   Substance and Sexual Activity     Alcohol use: Yes     Comment: Limited     Drug use: Not on file     Sexual activity: Not on file   Other Topics Concern     Not on file   Social History Narrative     Not on file     Social Determinants of Health     Financial Resource Strain: Not on file   Food Insecurity: Not on file   Transportation Needs: Not on file   Physical Activity: Not on file   Stress: Not on file   Social Connections: Not on file   Intimate Partner Violence: Not on file   Housing Stability: Not on file       All above reviewed and updated, all stable unless otherwise noted    Recent labs reviewed    ROS    As above, otherwise negative    CONSTITUTIONAL: NEGATIVE for fever, chills, change in weight  INTEGUMENTARY/SKIN: NEGATIVE for worrisome rashes, moles or lesions  EYES: NEGATIVE for vision changes or irritation  ENT/MOUTH: NEGATIVE for ear, mouth and throat problems  RESP:  NEGATIVE for significant cough or SOB  CV: NEGATIVE for chest pain, palpitations or peripheral edema  GI: NEGATIVE for nausea, abdominal pain, heartburn, or change in bowel habits  : NEGATIVE for frequency, dysuria, or hematuria  MUSCULOSKELETAL: NEGATIVE for significant arthralgias or myalgia  NEURO: NEGATIVE for weakness, dizziness or paresthesias  ENDOCRINE: NEGATIVE for temperature intolerance, skin/hair changes  HEME: NEGATIVE for bleeding problems  PSYCHIATRIC: NEGATIVE for changes in mood or affect    OBJECTIVE    /63   Pulse 71   Temp 97.9  F (36.6  C)   Resp 20   Wt 88.5 kg (195 lb)   SpO2 95%   There is no height or weight on file to calculate BMI.    BP Readings from Last 3 Encounters:   06/23/23 115/63   05/31/23 126/65       Wt Readings from Last 4 Encounters:   06/23/23 88.5 kg (195 lb)   05/31/23 88.5 kg (195 lb)       GENERAL: healthy, alert and no distress  EYES: Eyes grossly normal to inspection, extraocular movements - intact, and PERRL  NECK: no tenderness, no adenopathy, no asymmetry, no masses, no stiffness; thyroid- normal to palpation  RESP: lungs clear to auscultation - no rales, no rhonchi, no wheezes  CV: regular rates and rhythm, normal S1 S2, no S3 or S4 and no murmur, no click or rub -  ABDOMEN: soft, no tenderness, no  hepatosplenomegaly, no masses, normal bowel sounds  MS: extremities- no gross deformities noted, no edema  SKIN: no suspicious lesions, no rashes  NEURO: decreased memory, non focal  PSYCH: affect normal/bright    DIAGNOSTICS/PROCEDURES    Lab/Diagnostic data:    WBC Count   Date Value Ref Range Status   06/28/2023 6.6 4.0 - 11.0 10e3/uL Final   ]    Hemoglobin   Date Value Ref Range Status   06/28/2023 12.1 (L) 13.3 - 17.7 g/dL Final   ]    Last Basic Metabolic Panel:    Lab Results   Component Value Date     06/28/2023      Lab Results   Component Value Date    POTASSIUM 4.0 06/28/2023     Lab Results   Component Value Date    CURT 9.5 06/28/2023      Lab Results   Component Value Date    CO2 26 06/28/2023       Lab Results   Component Value Date    BUN 23.5 06/28/2023     Lab Results   Component Value Date    CR 1.06 06/28/2023       Lab Results   Component Value Date     06/28/2023       Recent labs reviewed     ASSESSMENT      ICD-10-CM    1. Cognitive impairment  R41.89       2. Type 2 diabetes mellitus without complication, without long-term current use of insulin (H)  E11.9       3. Primary hypertension  I10       4. Hyperlipidemia, unspecified hyperlipidemia type  E78.5       5. History of skin cancer  Z85.828       6. History of falling  Z91.81       7. Benign prostatic hyperplasia without urinary obstruction  N40.0       8. Chronic gouty arthritis  M1A.00X0       9. Medication monitoring encounter  Z51.81           PLAN    Discussed treatment/modality options, including risk and benefits, he desires:     Orders:    Continue current cares  Recent labs reviewed    All diagnosis above reviewed and noted above, otherwise stable.  See [employer] orders for further details.      Information reviewed:  Medications, vital signs, orders, nursing notes, problem list, hospital information.     Facility MDS and care plan reviewed.    Total time spent with patient visit was 40 minutes including patient visit and review of past records. Greater than 50% of total time spent with counseling and coordinating care.    Follow up in 4-6 month(s) and as needed.    FACE TO FACE    Documentation of Face to Face and Certification for Home Health Services    I certify that patient: Nick Cameron is under my care and that I, or a nurse practitioner or physician's assistant working with me, had a face-to-face encounter that meets the physician face-to-face encounter requirements with this patient on: 6/23/2023.    This encounter with the patient was in whole, or in part, for the following medical condition, which is the primary reason for home health care: see  above.    I certify that, based on my findings, the following services are medically necessary home health services: per memory care.    My clinical findings support the need for the above services because: see above    Further, I certify that my clinical findings support that this patient is homebound (i.e. absences from home require considerable and taxing effort and are for medical reasons or [employer] services or infrequently or of short duration when for other reasons) because: fall and safety concerns.    Based on the above findings. I certify that this patient is confined to the home and needs intermittent skilled nursing care, physical therapy and/or speech therapy.  The patient is under my care, and I have initiated the establishment of the plan of care.  This patient will be followed by a physician who will periodically review the plan of care.  Physician/Provider to provide follow up care: Luann Rogers    Attending hospital physician (the Medicare certified [place] provider): Vic Padilla MD  Physician Signature: See electronic signature associated with these discharge orders.  Date: 7/18/2023           Vic Padilla MD, FAAGrand [facility]asc[facility]  [address] [place] [id number]  [email or web address]   Office: [phone]  Fax: [phone]  Pager: [phone]

## 2023-08-25 NOTE — PROGRESS NOTES
AUDIOLOGY REPORT    SUBJECTIVE:  Nick Cameron is a 89 year old male who was seen in the Audiology Clinic at the [facility] for audiologic evaluation, referred by EDIE Bran, CNP. Accompanied by daughter, Sandra. Patient reports long-standing hearing loss. He worked for [employer] for 40+ years and was in some noisy environments at times without hearing protection. He denies otalgia, otorrhea, tinnitus, aural fullness, dizziness, past ear surgery,  family history of hearing loss, and prior use of amplification.    OBJECTIVE:  Abuse Screening:  Do you feel unsafe at home or work/school? No  Do you feel threatened by someone? No  Does anyone try to keep you from having contact with others, or doing things outside of your home? No  Physical signs of abuse present? No     Fall Risk Screen:  1. Have you fallen two or more times in the past year? No  2. Have you fallen and had an injury in the past year? No    Otoscopic exam indicates mild cerumen bilaterally. Offered to take wax out today but patient declined.      Pure Tone Thresholds assessed using conventional audiometry with good  reliability from 250-8000 Hz bilaterally using insert earphones and circumaural headphones     RIGHT:  Mild sloping to severe sensorineural hearing loss    LEFT: Mild sloping to profound sensorineural hearing loss    Tympanogram:    RIGHT: normal eardrum mobility    LEFT:   shallow mobility    Reflexes (reported by stimulus ear):  Could not test due to equipment limitations.     Speech Reception Threshold:    RIGHT: 45 dB HL    LEFT:   50 dB HL  Word Recognition Score:     RIGHT: 76% at 85 dB HL using NU-6 recorded word list.    LEFT:   52% at 90 dB HL using NU-6 recorded word list.    NOTE: word recognition was rechecked with inserts due to sore on top of patients head and inability to fully tighten headphones. Results improved with inserts in the left ear.    Patient is a hearing aid candidate. He is unsure  if he would like to proceed. He would like to think about it and discuss with his family.       PLAN:  Patient was counseled regarding hearing loss and impact on communication. He is a hearing aid candidate. He would like to discuss hearing aids with his family before proceeding with consultation. Appointments for hearing aid consult/fitting/initial check will remain scheduled and patient will call to cancel if he decides against proceeding with hearing aids. Please call this clinic with questions regarding these results or recommendations.      Damon Copeland., CCC-A  [place] Licensed Audiologist #[id number]

## 2023-08-29 NOTE — PROGRESS NOTES
[facility] GERIATRICS    Chief Complaint   Patient presents with    RECHECK     HPI:  Nick Cameron is a 89 year old  (7/30/1933), who is being seen today for an episodic care visit at: [facility] ([facility]) [[id number]].       Today's concern is:     RN staff report no concerns. [facility] patient recently underwent s/p Moh's procedure on 8/23/23 for SCC to scalp. RN staff assisting with dressing changes.     During exam, patient seen sitting in chair in [facility] care apartment. Reports doing well. Denies pain to scalp, only mild tenderness w/dressing changes. [facility] staff are assisting with wound care. Admits to good appetite. Sleeping well at night. Denies issues with bowel or bladder. Denies chest pain, SOB, headache, syncope.       Allergies, and PMH/PSH reviewed in [facility] today.    REVIEW OF SYSTEMS:  4 point ROS including Respiratory, CV, GI and , other than that noted in the HPI,  is negative    Objective:   /75   Pulse 65   Temp 98  F (36.7  C)   Resp 20   Wt 88.5 kg (195 lb)   SpO2 97%   GENERAL APPEARANCE:  Alert, in no distress, appears healthy, oriented, cooperative  RESP:  respiratory effort and palpation of chest normal, lungs clear to auscultation , no respiratory distress  CV:  Palpation and auscultation of heart done , regular rate and rhythm, no murmur, rub, or gallop, no edema  ABDOMEN:  normal bowel sounds, soft, nontender, no guarding or rebound  M/S:   Gait and station abnormal, sitting in chair.  SKIN:  Inspection of skin and subcutaneous tissue baseline, Palpation of skin and subcutaneous tissue baseline. Scalp wound dressing CDI, wound bed appears to be healing well without signs of infection.   NEURO:   Cranial nerves 2-12 are normal tested and grossly at patient's baseline, Examination of sensation by touch normal  PSYCH:  Oriented x 2, memory impaired , affect and mood normal      Recent labs in [facility] reviewed by me today.  and Most Recent 3  CBC's:  Recent Labs   Lab Test 06/28/23  0952   WBC 6.6   HGB 12.1*   MCV 98   *     Most Recent 3 BMP's:  Recent Labs   Lab Test 06/28/23  0952      POTASSIUM 4.0   CHLORIDE 106   CO2 26   BUN 23.5*   CR 1.06   ANIONGAP 12   CURT 9.5   *     TSH   Date Value Ref Range Status   06/28/2023 2.09 0.30 - 4.20 uIU/mL Final     Lab Results   Component Value Date    A1C 6.5 06/28/2023         Assessment/Plan:    (C44.42) Squamous cell carcinoma of scalp  (primary encounter diagnosis)  Comment: SCC of scalp resolved, s/p Moh's procedure on 8/23/23  Plan:   - Continue wound care as directed by Dermatologist  - Patient to follow-up with Dermatologist as directed  - Reviewed patient status and treatment plan with Tammy (daughter)    (R53.81) Physical deconditioning  (R41.89) Cognitive impairment  Comment: Ongoing physical deconditioning w/cognitive impairment. Primarily wheelchair bound, ambulates short distances w/walker.    Plan:   - Monitor changes in mood or behaviors  - Continue supportive services at [facility] memory care unit    (E11.9) Type 2 diabetes mellitus without complication, without long-term current use of insulin (H)  Comment: Controlled, last A1c 6.5% on 6/28/23  Plan:   - Continue metformin 1000mg every day  - Recheck A1c in 10/2023        Electronically signed by: EDIE Sibley CNP

## 2023-08-30 NOTE — LETTER
8/30/2023        RE: Nick Cameron  [facility]  [address]  [facility] [place] [address]        [facility] GERIATRICS    Chief Complaint   Patient presents with     RECHECK     HPI:  Nick Cameron is a 89 year old  (7/30/1933), who is being seen today for an episodic care visit at: [facility] ([facility]) [[id number]].       Today's concern is:     RN staff report no concerns. [facility] patient recently underwent s/p Moh's procedure on 8/23/23 for SCC to scalp. RN staff assisting with dressing changes.     During exam, patient seen sitting in chair in [facility] apartment. Reports doing well. Denies pain to scalp, only mild tenderness w/dressing changes. [facility] staff are assisting with wound care. Admits to good appetite. Sleeping well at night. Denies issues with bowel or bladder. Denies chest pain, SOB, headache, syncope.       Allergies, and PMH/PSH reviewed in [facility] today.    REVIEW OF SYSTEMS:  4 point ROS including Respiratory, CV, GI and , other than that noted in the HPI,  is negative    Objective:   /75   Pulse 65   Temp 98  F (36.7  C)   Resp 20   Wt 88.5 kg (195 lb)   SpO2 97%   GENERAL APPEARANCE:  Alert, in no distress, appears healthy, oriented, cooperative  RESP:  respiratory effort and palpation of chest normal, lungs clear to auscultation , no respiratory distress  CV:  Palpation and auscultation of heart done , regular rate and rhythm, no murmur, rub, or gallop, no edema  ABDOMEN:  normal bowel sounds, soft, nontender, no guarding or rebound  M/S:   Gait and station abnormal, sitting in chair.  SKIN:  Inspection of skin and subcutaneous tissue baseline, Palpation of skin and subcutaneous tissue baseline. Scalp wound dressing CDI, wound bed appears to be healing well without signs of infection.   NEURO:   Cranial nerves 2-12 are normal tested and grossly at patient's baseline, Examination of sensation by touch normal  PSYCH:  Oriented x 2, memory impaired ,  affect and mood normal      Recent labs in [facility] reviewed by me today.  and Most Recent 3 CBC's:  Recent Labs   Lab Test 06/28/23  0952   WBC 6.6   HGB 12.1*   MCV 98   *     Most Recent 3 BMP's:  Recent Labs   Lab Test 06/28/23  0952      POTASSIUM 4.0   CHLORIDE 106   CO2 26   BUN 23.5*   CR 1.06   ANIONGAP 12   CURT 9.5   *     TSH   Date Value Ref Range Status   06/28/2023 2.09 0.30 - 4.20 uIU/mL Final     Lab Results   Component Value Date    A1C 6.5 06/28/2023         Assessment/Plan:    (C44.42) Squamous cell carcinoma of scalp  (primary encounter diagnosis)  Comment: SCC of scalp resolved, s/p Moh's procedure on 8/23/23  Plan:   - Continue wound care as directed by Dermatologist  - Patient to follow-up with Dermatologist as directed  - Reviewed patient status and treatment plan with Tammy (daughter)    (R53.81) Physical deconditioning  (R41.89) Cognitive impairment  Comment: Ongoing physical deconditioning w/cognitive impairment. Primarily wheelchair bound, ambulates short distances w/walker.    Plan:   - Monitor changes in mood or behaviors  - Continue supportive services at [facility] memory care unit    (E11.9) Type 2 diabetes mellitus without complication, without long-term current use of insulin (H)  Comment: Controlled, last A1c 6.5% on 6/28/23  Plan:   - Continue metformin 1000mg every day  - Recheck A1c in 10/2023        Electronically signed by: EDIE Sibley CNP           Sincerely,        EDIE Sibley CNP

## 2023-11-14 NOTE — PROGRESS NOTES
[facility] GERIATRICS  Chief Complaint   Patient presents with    Annual Comprehensive Exam Assisted Living     [facility] Medical Record Number:  [id number]  Place of Service where encounter took place:  [facility] ([facility]) [[id number]]    HPI:    Nick Cameron  is a 89 year old  (7/30/1933), who is being seen today for an annual comprehensive visit. HPI information obtained from: facility chart records, facility staff, [facility] chart review, Care Everywhere Epic chart review, and family/first contact Tammy (daughter) report.       PMH: diabetes, HTN, cognitive impairment, physical deconditioning, hx SCC to scalp s/p Moh's procedure     Resides in [facility] memory care unit w/spouse.       Today's concerns:    Continues to have non-healing scalp wound following Moh's procedure, has been applying vaseline to scalp every day. Denies pain, headaches. Admits to good appetite. Sleeping well at night. Denies issues with bowel or bladder. Has wheelchair, self-transfers. Ambulates w/walker in apartment. Denies chest pain, SOB, headache, syncope.      ALLERGIES: Patient has no known allergies.  PAST MEDICAL HISTORY:   Past Medical History:   Diagnosis Date    BPH (benign prostatic hyperplasia)     DM2 (diabetes mellitus, type 2) (H)     Gout     HLD (hyperlipidemia)     HTN (hypertension)     Malignant neoplasm of skin     Davis      PAST SURGICAL HISTORY:  has a past surgical history that includes Cataract Extraction W/ Intraocular Lens Implant; Mohs micrographic procedure; and Repair MOHS.      Current Outpatient Medications:     acetaminophen (MAPAP) 500 MG CAPS, Take 1-2 capsules by mouth every 6 hours as needed, Disp: , Rfl:     aspirin (ASPIRIN 81) 81 MG chewable tablet, Take 81 mg by mouth daily, Disp: , Rfl:     atorvastatin (LIPITOR) 20 MG tablet, Take 20 mg by mouth daily, Disp: , Rfl:     diltiazem ER (DILT-XR) 120 MG 24 hr capsule, Take 120 mg by mouth daily, Disp: , Rfl:      "Emollient (CERAVE) CREA, Externally apply topically 2 times daily, Disp: , Rfl:     fluocinonide (LIDEX) 0.05 % external cream, Apply topically 2 times daily as needed (rash), Disp: , Rfl:     ibuprofen (ADVIL/MOTRIN) 400 MG tablet, Take 400 mg by mouth every 6 hours as needed for moderate pain, Disp: , Rfl:     ketoconazole (NIZORAL) 2 % external shampoo, Apply topically three times a week, Disp: , Rfl:     metFORMIN (GLUCOPHAGE XR) 500 MG 24 hr tablet, Take 1,000 mg by mouth at bedtime, Disp: , Rfl:     niacinamide 500 MG tablet, Take 500 mg by mouth 2 times daily, Disp: , Rfl:      MED REC REQUIRED  Post Medication Reconciliation Status: patient was not discharged from an inpatient facility or TCU      Case Management:  I have reviewed the Assisted Living care plan, current immunizations and preventive care/cancer screening.. Future cancer screening is not clinically indicated secondary to age/goals of care. Patient's desire to return to the community is not present. Current Level of Care is appropriate.mhgeroimmunization: Prevnar 20 and Annual Influenza per facility protocol      Advance Directive Discussion:    I reviewed the current advanced directives as reflected in EPIC, the POLST and the facility chart, and verified the congruency of orders. I contacted the first party Tammy (daughter) and discussed the plan of care. I did not due to cognitive impairment review the advance directives with the resident.     Team Discussion:  I communicated with the appropriate disciplines involved with the Plan of Care: Nursing  .   Patient's goal is: pain control and comfort.  Information reviewed: Medications, vital signs, orders, and nursing notes.      ROS:  Limited secondary to cognitive impairment but today pt reports no concerns      Vitals:  BP (!) 163/72   Pulse 87   Temp 98.4  F (36.9  C)   Resp 16   Ht 1.854 m (6' 1\")   Wt 88.5 kg (195 lb)   SpO2 97%   BMI 25.73 kg/m   Body mass index is 25.73 " kg/m .  Exam:  GENERAL APPEARANCE:  Alert, in no distress, appears healthy, oriented, cooperative  ENT:  Mouth and posterior oropharynx normal, moist mucous membranes, [place]  EYES:  EOM, conjunctivae, lids, pupils and irises normal, PERRL  RESP:  respiratory effort and palpation of chest normal, lungs clear to auscultation , no respiratory distress  CV:  Palpation and auscultation of heart done , regular rate and rhythm, no murmur, rub, or gallop, no edema  ABDOMEN:  normal bowel sounds, soft, nontender, no guarding or rebound  M/S:   Gait and station abnormal, sitting in chair. Stand pivot transfers from wheelchair to recliner.  SKIN:  Inspection of skin and subcutaneous tissue baseline, Palpation of skin and subcutaneous tissue baseline. Scalp wound ROYA (see photo below)  NEURO:   Cranial nerves 2-12 are normal tested and grossly at patient's baseline, Examination of sensation by touch normal  PSYCH:  Oriented to self; disoriented to place and date/year. , memory impaired , affect and mood normal      Photo 11/15/23:  scalp wound            Lab/Diagnostic data:   Recent labs in [facility] reviewed by me today.  Most Recent 3 CBC's:  Recent Labs   Lab Test 06/28/23  0952   WBC 6.6   HGB 12.1*   MCV 98   *     Most Recent 3 BMP's:  Recent Labs   Lab Test 06/28/23  0952      POTASSIUM 4.0   CHLORIDE 106   CO2 26   BUN 23.5*   CR 1.06   ANIONGAP 12   CURT 9.5   *     Liver Function Studies -   Recent Labs   Lab Test 06/28/23  0952   PROTTOTAL 6.4   ALBUMIN 3.8   BILITOTAL 0.9   ALKPHOS 109   AST 25   ALT 15       Lab Results   Component Value Date    A1C 6.5 06/28/2023         ASSESSMENT/PLAN:    (F03.B0) Moderate dementia without behavioral disturbance, psychotic disturbance, mood disturbance, or anxiety, unspecified dementia type (H)  (primary encounter diagnosis)  (R53.81) Physical deconditioning  Comment: Chronic dementia (oriented to self) with ongoing physical deconditioning. Primarily wheelchair  bound, able to stand and pivot for transfers.  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  Plan:   - Continue wound care as directed by Dermatology  - Patient to follow-up with Dermatologist on 11/20/23     (E11.9) Type 2 diabetes mellitus without complication, without long-term current use of insulin (H)  Comment: Controlled, last A1c 6.5% in 06/2023. Goal A1c < 8%.   Plan:   - Continue metformin. Plan to recheck A1c, consider stopping metformin.    (I10) Primary hypertension  Comment: Chronic. Based on JNC-8 goals,  patients age of 89 year old, presence of diabetes or CKD, and goals of care goal BP is <150/90 mm Hg. Patient is stable with current plan of care and routine assessment..  Plan:   - Continue ASA, lipitor, diltiazem  - Monitor BP/HR during routine visits    (N40.0) Benign prostatic hyperplasia without urinary obstruction  Comment: Chronic.  Hx taking flomax, unclear why this was discontinued. asymptomatic.   Plan:   - Monitor symptoms    (D69.6) Thrombocytopenia (H24)  Comment: Mild  Plan:   - Recheck CBC    (Z23) Need for pneumococcal vaccine  Comment: Due for PCV 20 vaccine  Plan:   - Administer PCV20 via IM x 1 dx need for pneumonia vaccine      Orders:  - Administer PCV20 via IM x 1 dx need for pneumonia vaccine  - Check BMP, CBC, A1c on 12/20/23 dx diabetes, thrombocytopenia, dementia        Electronically signed by:  EDIE Sibley CNP

## 2023-11-15 NOTE — LETTER
11/15/2023        RE: Nick Cameron  [facility]  [address]  [facility] [address]        [facility] GERIATRICS  Chief Complaint   Patient presents with    Annual Comprehensive Exam Assisted Living     [place] Medical Record Number:  [id number]  Place of Service where encounter took place:  [facility] ([facility]) [[id number]]    HPI:    Nick Cameron  is a 89 year old  (7/30/1933), who is being seen today for an annual comprehensive visit. HPI information obtained from: facility chart records, facility staff, [facility] chart review, Care Everywhere [facility] chart review, and family/first contact Tammy (daughter) report.       PMH: diabetes, HTN, cognitive impairment, physical deconditioning, hx SCC to scalp s/p Moh's procedure     Resides in [facility] memory care unit w/spouse.       Today's concerns:    Continues to have non-healing scalp wound following Moh's procedure, has been applying vaseline to scalp every day. Denies pain, headaches. Admits to good appetite. Sleeping well at night. Denies issues with bowel or bladder. Has wheelchair, self-transfers. Ambulates w/walker in apartment. Denies chest pain, SOB, headache, syncope.      ALLERGIES: Patient has no known allergies.  PAST MEDICAL HISTORY:   Past Medical History:   Diagnosis Date    BPH (benign prostatic hyperplasia)     DM2 (diabetes mellitus, type 2) (H)     Gout     HLD (hyperlipidemia)     HTN (hypertension)     Malignant neoplasm of skin     [place]      PAST SURGICAL HISTORY:  has a past surgical history that includes Cataract Extraction W/ Intraocular Lens Implant; Mohs micrographic procedure; and Repair MOHS.      Current Outpatient Medications:     acetaminophen (MAPAP) 500 MG CAPS, Take 1-2 capsules by mouth every 6 hours as needed, Disp: , Rfl:     aspirin (ASPIRIN 81) 81 MG chewable tablet, Take 81 mg by mouth daily, Disp: , Rfl:     atorvastatin (LIPITOR) 20 MG tablet, Take 20 mg by mouth daily, Disp:  , Rfl:     diltiazem ER (DILT-XR) 120 MG 24 hr capsule, Take 120 mg by mouth daily, Disp: , Rfl:     Emollient (CERAVE) CREA, Externally apply topically 2 times daily, Disp: , Rfl:     fluocinonide (LIDEX) 0.05 % external cream, Apply topically 2 times daily as needed (rash), Disp: , Rfl:     ibuprofen (ADVIL/MOTRIN) 400 MG tablet, Take 400 mg by mouth every 6 hours as needed for moderate pain, Disp: , Rfl:     ketoconazole (NIZORAL) 2 % external shampoo, Apply topically three times a week, Disp: , Rfl:     metFORMIN (GLUCOPHAGE XR) 500 MG 24 hr tablet, Take 1,000 mg by mouth at bedtime, Disp: , Rfl:     niacinamide 500 MG tablet, Take 500 mg by mouth 2 times daily, Disp: , Rfl:      MED REC REQUIRED  Post Medication Reconciliation Status: patient was not discharged from an inpatient facility or TCU      Case Management:  I have reviewed the Assisted Living care plan, current immunizations and preventive care/cancer screening.. Future cancer screening is not clinically indicated secondary to age/goals of care. Patient's desire to return to the community is not present. Current Level of Care is appropriate.mhgeroimmunization: Prevnar 20 and Annual Influenza per facility protocol      Advance Directive Discussion:    I reviewed the current advanced directives as reflected in EPIC, the POLST and the facility chart, and verified the congruency of orders. I contacted the first party Tammy (daughter) and discussed the plan of care. I did not due to cognitive impairment review the advance directives with the resident.     Team Discussion:  I communicated with the appropriate disciplines involved with the Plan of Care: Nursing  .   Patient's goal is: pain control and comfort.  Information reviewed: Medications, vital signs, orders, and nursing notes.      ROS:  Limited secondary to cognitive impairment but today pt reports no concerns      Vitals:  BP (!) 163/72   Pulse 87   Temp 98.4  F (36.9  C)   Resp 16   Ht 1.854 m  "(6' 1\")   Wt 88.5 kg (195 lb)   SpO2 97%   BMI 25.73 kg/m   Body mass index is 25.73 kg/m .  Exam:  GENERAL APPEARANCE:  Alert, in no distress, appears healthy, oriented, cooperative  ENT:  Mouth and posterior oropharynx normal, moist mucous membranes, [place]  EYES:  EOM, conjunctivae, lids, pupils and irises normal, PERRL  RESP:  respiratory effort and palpation of chest normal, lungs clear to auscultation , no respiratory distress  CV:  Palpation and auscultation of heart done , regular rate and rhythm, no murmur, rub, or gallop, no edema  ABDOMEN:  normal bowel sounds, soft, nontender, no guarding or rebound  M/S:   Gait and station abnormal, sitting in chair. Stand pivot transfers from wheelchair to recliner.  SKIN:  Inspection of skin and subcutaneous tissue baseline, Palpation of skin and subcutaneous tissue baseline. Scalp wound ROYA (see photo below)  NEURO:   Cranial nerves 2-12 are normal tested and grossly at patient's baseline, Examination of sensation by touch normal  PSYCH:  Oriented to self; disoriented to place and date/year. , memory impaired , affect and mood normal      Photo 11/15/23:  scalp wound            Lab/Diagnostic data:   Recent labs in [facility] reviewed by me today.  Most Recent 3 CBC's:  Recent Labs   Lab Test 06/28/23  0952   WBC 6.6   HGB 12.1*   MCV 98   *     Most Recent 3 BMP's:  Recent Labs   Lab Test 06/28/23  0952      POTASSIUM 4.0   CHLORIDE 106   CO2 26   BUN 23.5*   CR 1.06   ANIONGAP 12   CURT 9.5   *     Liver Function Studies -   Recent Labs   Lab Test 06/28/23  0952   PROTTOTAL 6.4   ALBUMIN 3.8   BILITOTAL 0.9   ALKPHOS 109   AST 25   ALT 15       Lab Results   Component Value Date    A1C 6.5 06/28/2023         ASSESSMENT/PLAN:    (F03.B0) Moderate dementia without behavioral disturbance, psychotic disturbance, mood disturbance, or anxiety, unspecified dementia type (H)  (primary encounter diagnosis)  (R53.81) Physical deconditioning  Comment: Chronic " dementia (oriented to self) with ongoing physical deconditioning. Primarily wheelchair bound, able to stand and pivot for transfers.  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  Plan:   - Continue wound care as directed by Dermatology  - Patient to follow-up with Dermatologist on 23     (E11.9) Type 2 diabetes mellitus without complication, without long-term current use of insulin (H)  Comment: Controlled, last A1c 6.5% in 2023. Goal A1c < 8%.   Plan:   - Continue metformin. Plan to recheck A1c, consider stopping metformin.    (I10) Primary hypertension  Comment: Chronic. Based on JNC-8 goals,  patients age of 89 year old, presence of diabetes or CKD, and goals of care goal BP is <150/90 mm Hg. Patient is stable with current plan of care and routine assessment..  Plan:   - Continue ASA, lipitor, diltiazem  - Monitor BP/HR during routine visits    (N40.0) Benign prostatic hyperplasia without urinary obstruction  Comment: Chronic.  Hx taking flomax, unclear why this was discontinued. asymptomatic.   Plan:   - Monitor symptoms    (D69.6) Thrombocytopenia (H24)  Comment: Mild  Plan:   - Recheck CBC    (Z23) Need for pneumococcal vaccine  Comment: Due for PCV 20 vaccine  Plan:   - Administer PCV20 via IM x 1 dx need for pneumonia vaccine      Orders:  - Administer PCV20 via IM x 1 dx need for pneumonia vaccine  - Check BMP, CBC, A1c on 23 dx diabetes, thrombocytopenia, dementia        Electronically signed by:  EDIE Sibley [facility]   2023     Name: Nick Cameron   : 1933       Orders:  - Administer PCV20 via IM x 1 dx need for pneumonia vaccine  - Check BMP, CBC, A1c on 23 dx diabetes,  thrombocytopenia, dementia        Electronically signed by  EDIE Sibley CNP on 12/13/2023 at 10:15 PM          Sincerely,        EDIE Sibley CNP

## 2023-12-14 NOTE — PROGRESS NOTES
[facility] Geriatrics   2023     Name: Nick Cameron   : 1933       Orders:  - Administer PCV20 via IM x 1 dx need for pneumonia vaccine  - Check BMP, CBC, A1c on 23 dx diabetes, thrombocytopenia, dementia        Electronically signed by  EDIE Sibley CNP on 2023 at 10:15 PM

## 2024-01-01 ENCOUNTER — TELEPHONE (OUTPATIENT)
Dept: GERIATRICS | Facility: CLINIC | Age: 89
End: 2024-01-01
Payer: MEDICARE

## 2024-01-01 ENCOUNTER — HOSPITAL ENCOUNTER (EMERGENCY)
Facility: CLINIC | Age: 89
Discharge: HOME | End: 2024-01-23
Attending: EMERGENCY MEDICINE | Admitting: EMERGENCY MEDICINE
Payer: MEDICARE

## 2024-01-01 ENCOUNTER — ASSISTED LIVING VISIT (OUTPATIENT)
Dept: GERIATRICS | Facility: CLINIC | Age: 89
End: 2024-01-01
Payer: MEDICARE

## 2024-01-01 ENCOUNTER — DOCUMENTATION ONLY (OUTPATIENT)
Dept: OTHER | Facility: CLINIC | Age: 89
End: 2024-01-01
Payer: MEDICARE

## 2024-01-01 ENCOUNTER — MYC MEDICAL ADVICE (OUTPATIENT)
Dept: GERIATRICS | Facility: CLINIC | Age: 89
End: 2024-01-01

## 2024-01-01 ENCOUNTER — LAB REQUISITION (OUTPATIENT)
Dept: LAB | Facility: CLINIC | Age: 89
End: 2024-01-01
Payer: MEDICARE

## 2024-01-01 ENCOUNTER — HEALTH MAINTENANCE LETTER (OUTPATIENT)
Age: 89
End: 2024-01-01

## 2024-01-01 ENCOUNTER — PATIENT OUTREACH (OUTPATIENT)
Dept: CARE COORDINATION | Facility: CLINIC | Age: 89
End: 2024-01-01
Payer: MEDICARE

## 2024-01-01 ENCOUNTER — DOCUMENTATION ONLY (OUTPATIENT)
Dept: GERIATRICS | Facility: CLINIC | Age: 89
End: 2024-01-01
Payer: MEDICARE

## 2024-01-01 ENCOUNTER — APPOINTMENT (OUTPATIENT)
Dept: PHYSICAL THERAPY | Facility: CLINIC | Age: 89
DRG: 071 | End: 2024-01-01
Attending: INTERNAL MEDICINE
Payer: MEDICARE

## 2024-01-01 ENCOUNTER — APPOINTMENT (OUTPATIENT)
Dept: PHYSICAL THERAPY | Facility: CLINIC | Age: 89
DRG: 071 | End: 2024-01-01
Payer: MEDICARE

## 2024-01-01 ENCOUNTER — APPOINTMENT (OUTPATIENT)
Dept: GENERAL RADIOLOGY | Facility: CLINIC | Age: 89
DRG: 071 | End: 2024-01-01
Attending: PHYSICIAN ASSISTANT
Payer: MEDICARE

## 2024-01-01 ENCOUNTER — APPOINTMENT (OUTPATIENT)
Dept: CT IMAGING | Facility: CLINIC | Age: 89
End: 2024-01-01
Attending: EMERGENCY MEDICINE
Payer: MEDICARE

## 2024-01-01 ENCOUNTER — MEDICAL CORRESPONDENCE (OUTPATIENT)
Dept: HEALTH INFORMATION MANAGEMENT | Facility: CLINIC | Age: 89
End: 2024-01-01

## 2024-01-01 ENCOUNTER — HOSPITAL ENCOUNTER (INPATIENT)
Facility: CLINIC | Age: 89
LOS: 4 days | Discharge: NURSING, REHAB OR LONG-TERM CARE | DRG: 071 | End: 2024-02-28
Attending: PHYSICIAN ASSISTANT | Admitting: STUDENT IN AN ORGANIZED HEALTH CARE EDUCATION/TRAINING PROGRAM
Payer: MEDICARE

## 2024-01-01 ENCOUNTER — APPOINTMENT (OUTPATIENT)
Dept: GENERAL RADIOLOGY | Facility: CLINIC | Age: 89
End: 2024-01-01
Attending: EMERGENCY MEDICINE
Payer: MEDICARE

## 2024-01-01 VITALS
DIASTOLIC BLOOD PRESSURE: 62 MMHG | HEART RATE: 72 BPM | OXYGEN SATURATION: 92 % | TEMPERATURE: 97.6 F | BODY MASS INDEX: 25.84 KG/M2 | HEIGHT: 73 IN | RESPIRATION RATE: 18 BRPM | WEIGHT: 195 LBS | SYSTOLIC BLOOD PRESSURE: 142 MMHG

## 2024-01-01 VITALS
WEIGHT: 195 LBS | DIASTOLIC BLOOD PRESSURE: 62 MMHG | BODY MASS INDEX: 25.84 KG/M2 | RESPIRATION RATE: 18 BRPM | HEIGHT: 73 IN | TEMPERATURE: 97.6 F | HEART RATE: 72 BPM | SYSTOLIC BLOOD PRESSURE: 142 MMHG | OXYGEN SATURATION: 92 %

## 2024-01-01 VITALS
SYSTOLIC BLOOD PRESSURE: 122 MMHG | OXYGEN SATURATION: 97 % | DIASTOLIC BLOOD PRESSURE: 75 MMHG | BODY MASS INDEX: 25.84 KG/M2 | HEIGHT: 73 IN | RESPIRATION RATE: 18 BRPM | WEIGHT: 195 LBS | HEART RATE: 95 BPM | TEMPERATURE: 97.3 F

## 2024-01-01 VITALS
BODY MASS INDEX: 25.84 KG/M2 | RESPIRATION RATE: 18 BRPM | DIASTOLIC BLOOD PRESSURE: 96 MMHG | TEMPERATURE: 97 F | OXYGEN SATURATION: 92 % | HEIGHT: 73 IN | HEART RATE: 68 BPM | SYSTOLIC BLOOD PRESSURE: 152 MMHG | WEIGHT: 195 LBS

## 2024-01-01 VITALS
WEIGHT: 195 LBS | RESPIRATION RATE: 20 BRPM | DIASTOLIC BLOOD PRESSURE: 66 MMHG | OXYGEN SATURATION: 98 % | SYSTOLIC BLOOD PRESSURE: 111 MMHG | HEIGHT: 73 IN | HEART RATE: 85 BPM | TEMPERATURE: 97.3 F | BODY MASS INDEX: 25.84 KG/M2

## 2024-01-01 VITALS
RESPIRATION RATE: 22 BRPM | WEIGHT: 180.12 LBS | BODY MASS INDEX: 23.76 KG/M2 | OXYGEN SATURATION: 95 % | SYSTOLIC BLOOD PRESSURE: 100 MMHG | DIASTOLIC BLOOD PRESSURE: 61 MMHG | TEMPERATURE: 97.4 F | HEART RATE: 83 BPM

## 2024-01-01 VITALS
RESPIRATION RATE: 18 BRPM | WEIGHT: 195 LBS | TEMPERATURE: 97.5 F | OXYGEN SATURATION: 95 % | DIASTOLIC BLOOD PRESSURE: 72 MMHG | HEIGHT: 73 IN | SYSTOLIC BLOOD PRESSURE: 129 MMHG | HEART RATE: 79 BPM | BODY MASS INDEX: 25.84 KG/M2

## 2024-01-01 VITALS
TEMPERATURE: 97.5 F | RESPIRATION RATE: 18 BRPM | WEIGHT: 195 LBS | OXYGEN SATURATION: 95 % | HEART RATE: 79 BPM | HEIGHT: 73 IN | BODY MASS INDEX: 25.84 KG/M2 | SYSTOLIC BLOOD PRESSURE: 129 MMHG | DIASTOLIC BLOOD PRESSURE: 72 MMHG

## 2024-01-01 VITALS
HEART RATE: 91 BPM | SYSTOLIC BLOOD PRESSURE: 127 MMHG | TEMPERATURE: 100.2 F | OXYGEN SATURATION: 90 % | DIASTOLIC BLOOD PRESSURE: 78 MMHG | RESPIRATION RATE: 16 BRPM

## 2024-01-01 DIAGNOSIS — M62.81 GENERALIZED MUSCLE WEAKNESS: ICD-10-CM

## 2024-01-01 DIAGNOSIS — R53.81 PHYSICAL DECONDITIONING: ICD-10-CM

## 2024-01-01 DIAGNOSIS — L22 DIAPER RASH: Primary | ICD-10-CM

## 2024-01-01 DIAGNOSIS — F02.B0 MODERATE LATE ONSET ALZHEIMER'S DEMENTIA WITHOUT BEHAVIORAL DISTURBANCE, PSYCHOTIC DISTURBANCE, MOOD DISTURBANCE, OR ANXIETY (H): ICD-10-CM

## 2024-01-01 DIAGNOSIS — E11.9 TYPE 2 DIABETES MELLITUS WITHOUT COMPLICATION, WITHOUT LONG-TERM CURRENT USE OF INSULIN (H): ICD-10-CM

## 2024-01-01 DIAGNOSIS — N39.0 UTI (URINARY TRACT INFECTION): ICD-10-CM

## 2024-01-01 DIAGNOSIS — R65.20 SEVERE SEPSIS (H): ICD-10-CM

## 2024-01-01 DIAGNOSIS — R53.1 WEAKNESS: ICD-10-CM

## 2024-01-01 DIAGNOSIS — R41.0 DISORIENTATION, UNSPECIFIED: ICD-10-CM

## 2024-01-01 DIAGNOSIS — E78.5 HYPERLIPIDEMIA, UNSPECIFIED HYPERLIPIDEMIA TYPE: ICD-10-CM

## 2024-01-01 DIAGNOSIS — F03.90 UNSPECIFIED DEMENTIA, UNSPECIFIED SEVERITY, WITHOUT BEHAVIORAL DISTURBANCE, PSYCHOTIC DISTURBANCE, MOOD DISTURBANCE, AND ANXIETY (H): ICD-10-CM

## 2024-01-01 DIAGNOSIS — R29.6 FALLS FREQUENTLY: ICD-10-CM

## 2024-01-01 DIAGNOSIS — R63.4 WEIGHT LOSS: ICD-10-CM

## 2024-01-01 DIAGNOSIS — H91.93 BILATERAL HEARING LOSS, UNSPECIFIED HEARING LOSS TYPE: ICD-10-CM

## 2024-01-01 DIAGNOSIS — G93.40 ENCEPHALOPATHY: Primary | ICD-10-CM

## 2024-01-01 DIAGNOSIS — F03.B0 MODERATE DEMENTIA WITHOUT BEHAVIORAL DISTURBANCE, PSYCHOTIC DISTURBANCE, MOOD DISTURBANCE, OR ANXIETY, UNSPECIFIED DEMENTIA TYPE (H): Primary | ICD-10-CM

## 2024-01-01 DIAGNOSIS — R26.81 UNSTEADY GAIT: ICD-10-CM

## 2024-01-01 DIAGNOSIS — R29.6 RECURRENT FALLS: ICD-10-CM

## 2024-01-01 DIAGNOSIS — G30.1 MODERATE LATE ONSET ALZHEIMER'S DEMENTIA WITHOUT BEHAVIORAL DISTURBANCE, PSYCHOTIC DISTURBANCE, MOOD DISTURBANCE, OR ANXIETY (H): ICD-10-CM

## 2024-01-01 DIAGNOSIS — G30.1 MODERATE LATE ONSET ALZHEIMER'S DEMENTIA WITHOUT BEHAVIORAL DISTURBANCE, PSYCHOTIC DISTURBANCE, MOOD DISTURBANCE, OR ANXIETY (H): Primary | ICD-10-CM

## 2024-01-01 DIAGNOSIS — L22 DIAPER RASH: ICD-10-CM

## 2024-01-01 DIAGNOSIS — N17.9 AKI (ACUTE KIDNEY INJURY) (H): ICD-10-CM

## 2024-01-01 DIAGNOSIS — A41.9 SEVERE SEPSIS (H): ICD-10-CM

## 2024-01-01 DIAGNOSIS — A41.9 SEPSIS, DUE TO UNSPECIFIED ORGANISM, UNSPECIFIED WHETHER ACUTE ORGAN DYSFUNCTION PRESENT (H): Primary | ICD-10-CM

## 2024-01-01 DIAGNOSIS — I10 PRIMARY HYPERTENSION: ICD-10-CM

## 2024-01-01 DIAGNOSIS — Z85.828 HISTORY OF SKIN CANCER: ICD-10-CM

## 2024-01-01 DIAGNOSIS — E11.9 TYPE 2 DIABETES MELLITUS WITHOUT COMPLICATIONS (H): ICD-10-CM

## 2024-01-01 DIAGNOSIS — F02.B0 MODERATE LATE ONSET ALZHEIMER'S DEMENTIA WITHOUT BEHAVIORAL DISTURBANCE, PSYCHOTIC DISTURBANCE, MOOD DISTURBANCE, OR ANXIETY (H): Primary | ICD-10-CM

## 2024-01-01 DIAGNOSIS — D72.820 LYMPHOCYTOSIS (SYMPTOMATIC): ICD-10-CM

## 2024-01-01 DIAGNOSIS — N17.8 OTHER ACUTE KIDNEY FAILURE (H): ICD-10-CM

## 2024-01-01 DIAGNOSIS — T14.8XXA ABRASION: ICD-10-CM
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
## 2024-01-01 DIAGNOSIS — R35.0 FREQUENCY OF MICTURITION: ICD-10-CM

## 2024-01-01 DIAGNOSIS — R53.1 WEAKNESS GENERALIZED: ICD-10-CM

## 2024-01-01 DIAGNOSIS — S90.414A: ICD-10-CM

## 2024-01-01 DIAGNOSIS — E11.9 TYPE 2 DIABETES MELLITUS WITHOUT COMPLICATION, WITHOUT LONG-TERM CURRENT USE OF INSULIN (H): Primary | ICD-10-CM

## 2024-01-01 DIAGNOSIS — E11.9 DIABETES MELLITUS, TYPE 2 (H): ICD-10-CM

## 2024-01-01 DIAGNOSIS — C44.42 SQUAMOUS CELL CARCINOMA OF SCALP: ICD-10-CM

## 2024-01-01 DIAGNOSIS — Z91.81 HISTORY OF FALLING: ICD-10-CM

## 2024-01-01 DIAGNOSIS — R39.15 URGENCY OF URINATION: ICD-10-CM

## 2024-01-01 DIAGNOSIS — F03.B0 MODERATE DEMENTIA WITHOUT BEHAVIORAL DISTURBANCE, PSYCHOTIC DISTURBANCE, MOOD DISTURBANCE, OR ANXIETY, UNSPECIFIED DEMENTIA TYPE (H): ICD-10-CM

## 2024-01-01 DIAGNOSIS — E11.9 DIABETES MELLITUS WITHOUT COMPLICATION (H): ICD-10-CM

## 2024-01-01 DIAGNOSIS — Z51.81 MEDICATION MONITORING ENCOUNTER: ICD-10-CM

## 2024-01-01 LAB
ALBUMIN SERPL BCG-MCNC: 3.1 G/DL (ref 3.5–5.2)
ALBUMIN SERPL BCG-MCNC: 3.1 G/DL (ref 3.5–5.2)
ALBUMIN UR-MCNC: 50 MG/DL
ALBUMIN UR-MCNC: 70 MG/DL
ALP SERPL-CCNC: 124 U/L (ref 40–150)
ALP SERPL-CCNC: 136 U/L (ref 40–150)
ALT SERPL W P-5'-P-CCNC: 11 U/L (ref 0–70)
ALT SERPL W P-5'-P-CCNC: 12 U/L (ref 0–70)
ANION GAP SERPL CALCULATED.3IONS-SCNC: 10 MMOL/L (ref 7–15)
ANION GAP SERPL CALCULATED.3IONS-SCNC: 10 MMOL/L (ref 7–15)
ANION GAP SERPL CALCULATED.3IONS-SCNC: 11 MMOL/L (ref 7–15)
ANION GAP SERPL CALCULATED.3IONS-SCNC: 12 MMOL/L (ref 7–15)
ANION GAP SERPL CALCULATED.3IONS-SCNC: 13 MMOL/L (ref 7–15)
ANION GAP SERPL CALCULATED.3IONS-SCNC: 7 MMOL/L (ref 7–15)
ANION GAP SERPL CALCULATED.3IONS-SCNC: 8 MMOL/L (ref 7–15)
ANION GAP SERPL CALCULATED.3IONS-SCNC: 9 MMOL/L (ref 7–15)
APPEARANCE UR: ABNORMAL
APPEARANCE UR: ABNORMAL
AST SERPL W P-5'-P-CCNC: 23 U/L (ref 0–45)
AST SERPL W P-5'-P-CCNC: 28 U/L (ref 0–45)
ATRIAL RATE - MUSE: 96 BPM
BACTERIA #/AREA URNS HPF: ABNORMAL /HPF
BACTERIA BLD CULT: NO GROWTH
BACTERIA BLD CULT: NO GROWTH
BACTERIA UR CULT: NORMAL
BACTERIA UR CULT: NORMAL
BASOPHILS # BLD AUTO: 0 10E3/UL (ref 0–0.2)
BASOPHILS NFR BLD AUTO: 0 %
BILIRUB SERPL-MCNC: 1.1 MG/DL
BILIRUB SERPL-MCNC: 1.4 MG/DL
BILIRUB UR QL STRIP: NEGATIVE
BILIRUB UR QL STRIP: NEGATIVE
BUN SERPL-MCNC: 17.1 MG/DL (ref 8–23)
BUN SERPL-MCNC: 20 MG/DL (ref 8–23)
BUN SERPL-MCNC: 20 MG/DL (ref 8–23)
BUN SERPL-MCNC: 21 MG/DL (ref 8–23)
BUN SERPL-MCNC: 24 MG/DL (ref 8–23)
BUN SERPL-MCNC: 24 MG/DL (ref 8–23)
BUN SERPL-MCNC: 25.9 MG/DL (ref 8–23)
BUN SERPL-MCNC: 29 MG/DL (ref 8–23)
CALCIUM SERPL-MCNC: 8.2 MG/DL (ref 8.2–9.6)
CALCIUM SERPL-MCNC: 8.2 MG/DL (ref 8.2–9.6)
CALCIUM SERPL-MCNC: 8.4 MG/DL (ref 8.2–9.6)
CALCIUM SERPL-MCNC: 8.5 MG/DL (ref 8.2–9.6)
CALCIUM SERPL-MCNC: 8.6 MG/DL (ref 8.2–9.6)
CALCIUM SERPL-MCNC: 8.8 MG/DL (ref 8.2–9.6)
CALCIUM SERPL-MCNC: 8.9 MG/DL (ref 8.2–9.6)
CALCIUM SERPL-MCNC: 8.9 MG/DL (ref 8.2–9.6)
CHLORIDE SERPL-SCNC: 100 MMOL/L (ref 98–107)
CHLORIDE SERPL-SCNC: 101 MMOL/L (ref 98–107)
CHLORIDE SERPL-SCNC: 102 MMOL/L (ref 98–107)
CHLORIDE SERPL-SCNC: 103 MMOL/L (ref 98–107)
CHLORIDE SERPL-SCNC: 104 MMOL/L (ref 98–107)
CHLORIDE SERPL-SCNC: 104 MMOL/L (ref 98–107)
CHLORIDE SERPL-SCNC: 105 MMOL/L (ref 98–107)
CHLORIDE SERPL-SCNC: 99 MMOL/L (ref 98–107)
COLOR UR AUTO: ABNORMAL
COLOR UR AUTO: YELLOW
CREAT SERPL-MCNC: 0.87 MG/DL (ref 0.67–1.17)
CREAT SERPL-MCNC: 0.93 MG/DL (ref 0.67–1.17)
CREAT SERPL-MCNC: 1.01 MG/DL (ref 0.67–1.17)
CREAT SERPL-MCNC: 1.02 MG/DL (ref 0.67–1.17)
CREAT SERPL-MCNC: 1.08 MG/DL (ref 0.67–1.17)
CREAT SERPL-MCNC: 1.14 MG/DL (ref 0.67–1.17)
CREAT SERPL-MCNC: 1.31 MG/DL (ref 0.67–1.17)
CREAT SERPL-MCNC: 1.48 MG/DL (ref 0.67–1.17)
DEPRECATED HCO3 PLAS-SCNC: 22 MMOL/L (ref 22–29)
DEPRECATED HCO3 PLAS-SCNC: 22 MMOL/L (ref 22–29)
DEPRECATED HCO3 PLAS-SCNC: 23 MMOL/L (ref 22–29)
DEPRECATED HCO3 PLAS-SCNC: 23 MMOL/L (ref 22–29)
DEPRECATED HCO3 PLAS-SCNC: 24 MMOL/L (ref 22–29)
DEPRECATED HCO3 PLAS-SCNC: 25 MMOL/L (ref 22–29)
DIASTOLIC BLOOD PRESSURE - MUSE: NORMAL MMHG
EGFRCR SERPLBLD CKD-EPI 2021: 45 ML/MIN/1.73M2
EGFRCR SERPLBLD CKD-EPI 2021: 52 ML/MIN/1.73M2
EGFRCR SERPLBLD CKD-EPI 2021: 61 ML/MIN/1.73M2
EGFRCR SERPLBLD CKD-EPI 2021: 65 ML/MIN/1.73M2
EGFRCR SERPLBLD CKD-EPI 2021: 70 ML/MIN/1.73M2
EGFRCR SERPLBLD CKD-EPI 2021: 71 ML/MIN/1.73M2
EGFRCR SERPLBLD CKD-EPI 2021: 78 ML/MIN/1.73M2
EGFRCR SERPLBLD CKD-EPI 2021: 82 ML/MIN/1.73M2
EOSINOPHIL # BLD AUTO: 0 10E3/UL (ref 0–0.7)
EOSINOPHIL # BLD AUTO: 0.1 10E3/UL (ref 0–0.7)
EOSINOPHIL # BLD AUTO: 0.2 10E3/UL (ref 0–0.7)
EOSINOPHIL NFR BLD AUTO: 0 %
EOSINOPHIL NFR BLD AUTO: 1 %
EOSINOPHIL NFR BLD AUTO: 2 %
ERYTHROCYTE [DISTWIDTH] IN BLOOD BY AUTOMATED COUNT: 15.1 % (ref 10–15)
ERYTHROCYTE [DISTWIDTH] IN BLOOD BY AUTOMATED COUNT: 15.2 % (ref 10–15)
ERYTHROCYTE [DISTWIDTH] IN BLOOD BY AUTOMATED COUNT: 15.4 % (ref 10–15)
ERYTHROCYTE [DISTWIDTH] IN BLOOD BY AUTOMATED COUNT: 15.4 % (ref 10–15)
ERYTHROCYTE [DISTWIDTH] IN BLOOD BY AUTOMATED COUNT: 15.5 % (ref 10–15)
ERYTHROCYTE [DISTWIDTH] IN BLOOD BY AUTOMATED COUNT: 15.6 % (ref 10–15)
ERYTHROCYTE [DISTWIDTH] IN BLOOD BY AUTOMATED COUNT: 15.8 % (ref 10–15)
FLUAV RNA SPEC QL NAA+PROBE: NEGATIVE
FLUAV RNA SPEC QL NAA+PROBE: NEGATIVE
FLUBV RNA RESP QL NAA+PROBE: NEGATIVE
FLUBV RNA RESP QL NAA+PROBE: NEGATIVE
GLUCOSE BLDC GLUCOMTR-MCNC: 127 MG/DL (ref 70–99)
GLUCOSE BLDC GLUCOMTR-MCNC: 146 MG/DL (ref 70–99)
GLUCOSE BLDC GLUCOMTR-MCNC: 149 MG/DL (ref 70–99)
GLUCOSE BLDC GLUCOMTR-MCNC: 149 MG/DL (ref 70–99)
GLUCOSE BLDC GLUCOMTR-MCNC: 150 MG/DL (ref 70–99)
GLUCOSE BLDC GLUCOMTR-MCNC: 155 MG/DL (ref 70–99)
GLUCOSE BLDC GLUCOMTR-MCNC: 163 MG/DL (ref 70–99)
GLUCOSE BLDC GLUCOMTR-MCNC: 164 MG/DL (ref 70–99)
GLUCOSE BLDC GLUCOMTR-MCNC: 169 MG/DL (ref 70–99)
GLUCOSE BLDC GLUCOMTR-MCNC: 177 MG/DL (ref 70–99)
GLUCOSE BLDC GLUCOMTR-MCNC: 178 MG/DL (ref 70–99)
GLUCOSE BLDC GLUCOMTR-MCNC: 184 MG/DL (ref 70–99)
GLUCOSE BLDC GLUCOMTR-MCNC: 189 MG/DL (ref 70–99)
GLUCOSE BLDC GLUCOMTR-MCNC: 193 MG/DL (ref 70–99)
GLUCOSE BLDC GLUCOMTR-MCNC: 200 MG/DL (ref 70–99)
GLUCOSE BLDC GLUCOMTR-MCNC: 200 MG/DL (ref 70–99)
GLUCOSE BLDC GLUCOMTR-MCNC: 206 MG/DL (ref 70–99)
GLUCOSE BLDC GLUCOMTR-MCNC: 279 MG/DL (ref 70–99)
GLUCOSE BLDC GLUCOMTR-MCNC: 291 MG/DL (ref 70–99)
GLUCOSE SERPL-MCNC: 157 MG/DL (ref 70–99)
GLUCOSE SERPL-MCNC: 173 MG/DL (ref 70–99)
GLUCOSE SERPL-MCNC: 179 MG/DL (ref 70–99)
GLUCOSE SERPL-MCNC: 184 MG/DL (ref 70–99)
GLUCOSE SERPL-MCNC: 202 MG/DL (ref 70–99)
GLUCOSE SERPL-MCNC: 210 MG/DL (ref 70–99)
GLUCOSE SERPL-MCNC: 225 MG/DL (ref 70–99)
GLUCOSE SERPL-MCNC: 241 MG/DL (ref 70–99)
GLUCOSE UR STRIP-MCNC: 1000 MG/DL
GLUCOSE UR STRIP-MCNC: 150 MG/DL
HCT VFR BLD AUTO: 30.3 % (ref 40–53)
HCT VFR BLD AUTO: 30.4 % (ref 40–53)
HCT VFR BLD AUTO: 31 % (ref 40–53)
HCT VFR BLD AUTO: 31.6 % (ref 40–53)
HCT VFR BLD AUTO: 31.8 % (ref 40–53)
HCT VFR BLD AUTO: 31.8 % (ref 40–53)
HCT VFR BLD AUTO: 32.5 % (ref 40–53)
HCT VFR BLD AUTO: 32.7 % (ref 40–53)
HCT VFR BLD AUTO: 34.2 % (ref 40–53)
HGB BLD-MCNC: 10.3 G/DL (ref 13.3–17.7)
HGB BLD-MCNC: 10.3 G/DL (ref 13.3–17.7)
HGB BLD-MCNC: 10.5 G/DL (ref 13.3–17.7)
HGB BLD-MCNC: 10.5 G/DL (ref 13.3–17.7)
HGB BLD-MCNC: 10.6 G/DL (ref 13.3–17.7)
HGB BLD-MCNC: 10.6 G/DL (ref 13.3–17.7)
HGB BLD-MCNC: 10.8 G/DL (ref 13.3–17.7)
HGB BLD-MCNC: 11.1 G/DL (ref 13.3–17.7)
HGB BLD-MCNC: 11.2 G/DL (ref 13.3–17.7)
HGB UR QL STRIP: ABNORMAL
HGB UR QL STRIP: NEGATIVE
HOLD SPECIMEN: NORMAL
IMM GRANULOCYTES # BLD: 0.1 10E3/UL
IMM GRANULOCYTES # BLD: 0.2 10E3/UL
IMM GRANULOCYTES # BLD: 0.3 10E3/UL
IMM GRANULOCYTES NFR BLD: 1 %
IMM GRANULOCYTES NFR BLD: 2 %
INTERPRETATION ECG - MUSE: NORMAL
KETONES UR STRIP-MCNC: ABNORMAL MG/DL
KETONES UR STRIP-MCNC: NEGATIVE MG/DL
LACTATE SERPL-SCNC: 2.1 MMOL/L (ref 0.7–2)
LACTATE SERPL-SCNC: 2.8 MMOL/L (ref 0.7–2)
LACTATE SERPL-SCNC: 3 MMOL/L (ref 0.7–2)
LACTATE SERPL-SCNC: 3.2 MMOL/L (ref 0.7–2)
LDH SERPL L TO P-CCNC: 270 U/L (ref 0–250)
LEUKOCYTE ESTERASE UR QL STRIP: ABNORMAL
LEUKOCYTE ESTERASE UR QL STRIP: ABNORMAL
LYMPHOCYTES # BLD AUTO: 2 10E3/UL (ref 0.8–5.3)
LYMPHOCYTES # BLD AUTO: 2.6 10E3/UL (ref 0.8–5.3)
LYMPHOCYTES # BLD AUTO: 3 10E3/UL (ref 0.8–5.3)
LYMPHOCYTES # BLD AUTO: 3.1 10E3/UL (ref 0.8–5.3)
LYMPHOCYTES # BLD AUTO: 3.8 10E3/UL (ref 0.8–5.3)
LYMPHOCYTES NFR BLD AUTO: 18 %
LYMPHOCYTES NFR BLD AUTO: 19 %
LYMPHOCYTES NFR BLD AUTO: 22 %
LYMPHOCYTES NFR BLD AUTO: 35 %
LYMPHOCYTES NFR BLD AUTO: 40 %
MAGNESIUM SERPL-MCNC: 1.5 MG/DL (ref 1.7–2.3)
MAGNESIUM SERPL-MCNC: 1.7 MG/DL (ref 1.7–2.3)
MAGNESIUM SERPL-MCNC: 1.7 MG/DL (ref 1.7–2.3)
MCH RBC QN AUTO: 33.1 PG (ref 26.5–33)
MCH RBC QN AUTO: 33.4 PG (ref 26.5–33)
MCH RBC QN AUTO: 33.5 PG (ref 26.5–33)
MCH RBC QN AUTO: 33.7 PG (ref 26.5–33)
MCH RBC QN AUTO: 33.7 PG (ref 26.5–33)
MCH RBC QN AUTO: 33.8 PG (ref 26.5–33)
MCH RBC QN AUTO: 33.8 PG (ref 26.5–33)
MCHC RBC AUTO-ENTMCNC: 32.5 G/DL (ref 31.5–36.5)
MCHC RBC AUTO-ENTMCNC: 33 G/DL (ref 31.5–36.5)
MCHC RBC AUTO-ENTMCNC: 33 G/DL (ref 31.5–36.5)
MCHC RBC AUTO-ENTMCNC: 33.2 G/DL (ref 31.5–36.5)
MCHC RBC AUTO-ENTMCNC: 33.5 G/DL (ref 31.5–36.5)
MCHC RBC AUTO-ENTMCNC: 33.9 G/DL (ref 31.5–36.5)
MCHC RBC AUTO-ENTMCNC: 34 G/DL (ref 31.5–36.5)
MCHC RBC AUTO-ENTMCNC: 34.2 G/DL (ref 31.5–36.5)
MCHC RBC AUTO-ENTMCNC: 34.3 G/DL (ref 31.5–36.5)
MCV RBC AUTO: 100 FL (ref 78–100)
MCV RBC AUTO: 104 FL (ref 78–100)
MCV RBC AUTO: 98 FL (ref 78–100)
MCV RBC AUTO: 99 FL (ref 78–100)
MCV RBC AUTO: 99 FL (ref 78–100)
MONOCYTES # BLD AUTO: 0.8 10E3/UL (ref 0–1.3)
MONOCYTES # BLD AUTO: 0.9 10E3/UL (ref 0–1.3)
MONOCYTES # BLD AUTO: 1 10E3/UL (ref 0–1.3)
MONOCYTES # BLD AUTO: 1.1 10E3/UL (ref 0–1.3)
MONOCYTES # BLD AUTO: 1.3 10E3/UL (ref 0–1.3)
MONOCYTES NFR BLD AUTO: 10 %
MONOCYTES NFR BLD AUTO: 11 %
MONOCYTES NFR BLD AUTO: 8 %
MUCOUS THREADS #/AREA URNS LPF: PRESENT /LPF
NEUTROPHILS # BLD AUTO: 11.5 10E3/UL (ref 1.6–8.3)
NEUTROPHILS # BLD AUTO: 4.4 10E3/UL (ref 1.6–8.3)
NEUTROPHILS # BLD AUTO: 4.6 10E3/UL (ref 1.6–8.3)
NEUTROPHILS # BLD AUTO: 8 10E3/UL (ref 1.6–8.3)
NEUTROPHILS # BLD AUTO: 8.1 10E3/UL (ref 1.6–8.3)
NEUTROPHILS NFR BLD AUTO: 49 %
NEUTROPHILS NFR BLD AUTO: 53 %
NEUTROPHILS NFR BLD AUTO: 68 %
NEUTROPHILS NFR BLD AUTO: 71 %
NEUTROPHILS NFR BLD AUTO: 71 %
NITRATE UR QL: NEGATIVE
NITRATE UR QL: NEGATIVE
NRBC # BLD AUTO: 0 10E3/UL
NRBC BLD AUTO-RTO: 0 /100
P AXIS - MUSE: 79 DEGREES
PATH REPORT.COMMENTS IMP SPEC: NORMAL
PATH REPORT.COMMENTS IMP SPEC: NORMAL
PATH REPORT.FINAL DX SPEC: NORMAL
PATH REPORT.MICROSCOPIC SPEC OTHER STN: NORMAL
PATH REPORT.MICROSCOPIC SPEC OTHER STN: NORMAL
PATH REPORT.RELEVANT HX SPEC: NORMAL
PH UR STRIP: 5.5 [PH] (ref 5–7)
PH UR STRIP: 6.5 [PH] (ref 5–7)
PLATELET # BLD AUTO: 152 10E3/UL (ref 150–450)
PLATELET # BLD AUTO: 161 10E3/UL (ref 150–450)
PLATELET # BLD AUTO: 163 10E3/UL (ref 150–450)
PLATELET # BLD AUTO: 165 10E3/UL (ref 150–450)
PLATELET # BLD AUTO: 165 10E3/UL (ref 150–450)
PLATELET # BLD AUTO: 168 10E3/UL (ref 150–450)
PLATELET # BLD AUTO: 169 10E3/UL (ref 150–450)
PLATELET # BLD AUTO: 209 10E3/UL (ref 150–450)
PLATELET # BLD AUTO: 215 10E3/UL (ref 150–450)
POTASSIUM SERPL-SCNC: 4 MMOL/L (ref 3.4–5.3)
POTASSIUM SERPL-SCNC: 4.1 MMOL/L (ref 3.4–5.3)
POTASSIUM SERPL-SCNC: 4.4 MMOL/L (ref 3.4–5.3)
POTASSIUM SERPL-SCNC: 4.6 MMOL/L (ref 3.4–5.3)
POTASSIUM SERPL-SCNC: 4.6 MMOL/L (ref 3.4–5.3)
POTASSIUM SERPL-SCNC: 4.8 MMOL/L (ref 3.4–5.3)
PR INTERVAL - MUSE: 266 MS
PROCALCITONIN SERPL IA-MCNC: 0.6 NG/ML
PROT SERPL-MCNC: 6.3 G/DL (ref 6.4–8.3)
PROT SERPL-MCNC: 6.7 G/DL (ref 6.4–8.3)
QRS DURATION - MUSE: 118 MS
QT - MUSE: 344 MS
QTC - MUSE: 434 MS
R AXIS - MUSE: -53 DEGREES
RBC # BLD AUTO: 3.05 10E6/UL (ref 4.4–5.9)
RBC # BLD AUTO: 3.06 10E6/UL (ref 4.4–5.9)
RBC # BLD AUTO: 3.16 10E6/UL (ref 4.4–5.9)
RBC # BLD AUTO: 3.17 10E6/UL (ref 4.4–5.9)
RBC # BLD AUTO: 3.26 10E6/UL (ref 4.4–5.9)
RBC # BLD AUTO: 3.29 10E6/UL (ref 4.4–5.9)
RBC # BLD AUTO: 3.31 10E6/UL (ref 4.4–5.9)
RBC URINE: 14 /HPF
RBC URINE: 6 /HPF
RETICS # AUTO: 0.09 10E6/UL (ref 0.03–0.1)
RETICS/RBC NFR AUTO: 3 % (ref 0.5–2)
RSV RNA SPEC NAA+PROBE: NEGATIVE
RSV RNA SPEC NAA+PROBE: NEGATIVE
SARS-COV-2 RNA RESP QL NAA+PROBE: NEGATIVE
SARS-COV-2 RNA RESP QL NAA+PROBE: NEGATIVE
SODIUM SERPL-SCNC: 134 MMOL/L (ref 135–145)
SODIUM SERPL-SCNC: 134 MMOL/L (ref 135–145)
SODIUM SERPL-SCNC: 135 MMOL/L (ref 135–145)
SODIUM SERPL-SCNC: 135 MMOL/L (ref 135–145)
SODIUM SERPL-SCNC: 136 MMOL/L (ref 135–145)
SODIUM SERPL-SCNC: 136 MMOL/L (ref 135–145)
SODIUM SERPL-SCNC: 137 MMOL/L (ref 135–145)
SODIUM SERPL-SCNC: 138 MMOL/L (ref 135–145)
SP GR UR STRIP: 1.02 (ref 1–1.03)
SP GR UR STRIP: 1.03 (ref 1–1.03)
SQUAMOUS EPITHELIAL: <1 /HPF
SYSTOLIC BLOOD PRESSURE - MUSE: NORMAL MMHG
T AXIS - MUSE: 9 DEGREES
TRANSITIONAL EPI: <1 /HPF
TSH SERPL DL<=0.005 MIU/L-ACNC: 0.91 UIU/ML (ref 0.3–4.2)
UROBILINOGEN UR STRIP-MCNC: 8 MG/DL
UROBILINOGEN UR STRIP-MCNC: NORMAL MG/DL
VENTRICULAR RATE- MUSE: 96 BPM
WBC # BLD AUTO: 10.9 10E3/UL (ref 4–11)
WBC # BLD AUTO: 11.2 10E3/UL (ref 4–11)
WBC # BLD AUTO: 12.4 10E3/UL (ref 4–11)
WBC # BLD AUTO: 12.4 10E3/UL (ref 4–11)
WBC # BLD AUTO: 12.8 10E3/UL (ref 4–11)
WBC # BLD AUTO: 16.2 10E3/UL (ref 4–11)
WBC # BLD AUTO: 8.6 10E3/UL (ref 4–11)
WBC # BLD AUTO: 9.2 10E3/UL (ref 4–11)
WBC # BLD AUTO: 9.5 10E3/UL (ref 4–11)
WBC CLUMPS #/AREA URNS HPF: PRESENT /HPF
WBC URINE: 172 /HPF
WBC URINE: >182 /HPF

## 2024-01-01 PROCEDURE — 36415 COLL VENOUS BLD VENIPUNCTURE: CPT | Performed by: INTERNAL MEDICINE

## 2024-01-01 PROCEDURE — 250N000011 HC RX IP 250 OP 636: Performed by: PHYSICIAN ASSISTANT

## 2024-01-01 PROCEDURE — 99349 HOME/RES VST EST MOD MDM 40: CPT | Performed by: NURSE PRACTITIONER

## 2024-01-01 PROCEDURE — 99348 HOME/RES VST EST LOW MDM 30: CPT | Performed by: NURSE PRACTITIONER

## 2024-01-01 PROCEDURE — 84443 ASSAY THYROID STIM HORMONE: CPT | Performed by: PHYSICIAN ASSISTANT

## 2024-01-01 PROCEDURE — 85027 COMPLETE CBC AUTOMATED: CPT | Performed by: PHYSICIAN ASSISTANT

## 2024-01-01 PROCEDURE — 96361 HYDRATE IV INFUSION ADD-ON: CPT

## 2024-01-01 PROCEDURE — 85060 BLOOD SMEAR INTERPRETATION: CPT | Performed by: PATHOLOGY

## 2024-01-01 PROCEDURE — 36415 COLL VENOUS BLD VENIPUNCTURE: CPT | Performed by: PHYSICIAN ASSISTANT

## 2024-01-01 PROCEDURE — 97530 THERAPEUTIC ACTIVITIES: CPT | Mod: GP | Performed by: PHYSICAL THERAPIST

## 2024-01-01 PROCEDURE — 83735 ASSAY OF MAGNESIUM: CPT | Performed by: INTERNAL MEDICINE

## 2024-01-01 PROCEDURE — 97162 PT EVAL MOD COMPLEX 30 MIN: CPT | Mod: GP

## 2024-01-01 PROCEDURE — 83735 ASSAY OF MAGNESIUM: CPT | Performed by: PHYSICIAN ASSISTANT

## 2024-01-01 PROCEDURE — 80048 BASIC METABOLIC PNL TOTAL CA: CPT | Mod: ORL | Performed by: NURSE PRACTITIONER

## 2024-01-01 PROCEDURE — 97530 THERAPEUTIC ACTIVITIES: CPT | Mod: GP

## 2024-01-01 PROCEDURE — 250N000013 HC RX MED GY IP 250 OP 250 PS 637: Performed by: PHYSICIAN ASSISTANT

## 2024-01-01 PROCEDURE — 80048 BASIC METABOLIC PNL TOTAL CA: CPT | Performed by: PHYSICIAN ASSISTANT

## 2024-01-01 PROCEDURE — 258N000003 HC RX IP 258 OP 636: Performed by: EMERGENCY MEDICINE

## 2024-01-01 PROCEDURE — 87637 SARSCOV2&INF A&B&RSV AMP PRB: CPT | Performed by: EMERGENCY MEDICINE

## 2024-01-01 PROCEDURE — 71046 X-RAY EXAM CHEST 2 VIEWS: CPT

## 2024-01-01 PROCEDURE — 99238 HOSP IP/OBS DSCHRG MGMT 30/<: CPT | Performed by: INTERNAL MEDICINE

## 2024-01-01 PROCEDURE — 120N000001 HC R&B MED SURG/OB

## 2024-01-01 PROCEDURE — 80048 BASIC METABOLIC PNL TOTAL CA: CPT | Performed by: INTERNAL MEDICINE

## 2024-01-01 PROCEDURE — 83605 ASSAY OF LACTIC ACID: CPT | Performed by: HOSPITALIST

## 2024-01-01 PROCEDURE — 85025 COMPLETE CBC W/AUTO DIFF WBC: CPT | Performed by: INTERNAL MEDICINE

## 2024-01-01 PROCEDURE — 71045 X-RAY EXAM CHEST 1 VIEW: CPT

## 2024-01-01 PROCEDURE — 99348 HOME/RES VST EST LOW MDM 30: CPT | Performed by: FAMILY MEDICINE

## 2024-01-01 PROCEDURE — 83605 ASSAY OF LACTIC ACID: CPT | Performed by: PHYSICIAN ASSISTANT

## 2024-01-01 PROCEDURE — 82962 GLUCOSE BLOOD TEST: CPT

## 2024-01-01 PROCEDURE — P9603 ONE-WAY ALLOW PRORATED MILES: HCPCS | Mod: ORL | Performed by: NURSE PRACTITIONER

## 2024-01-01 PROCEDURE — 87086 URINE CULTURE/COLONY COUNT: CPT | Mod: ORL | Performed by: NURSE PRACTITIONER

## 2024-01-01 PROCEDURE — 250N000011 HC RX IP 250 OP 636: Performed by: INTERNAL MEDICINE

## 2024-01-01 PROCEDURE — 81001 URINALYSIS AUTO W/SCOPE: CPT | Performed by: PHYSICIAN ASSISTANT

## 2024-01-01 PROCEDURE — 87086 URINE CULTURE/COLONY COUNT: CPT | Performed by: PHYSICIAN ASSISTANT

## 2024-01-01 PROCEDURE — 85045 AUTOMATED RETICULOCYTE COUNT: CPT | Performed by: INTERNAL MEDICINE

## 2024-01-01 PROCEDURE — 258N000003 HC RX IP 258 OP 636: Performed by: PHYSICIAN ASSISTANT

## 2024-01-01 PROCEDURE — 258N000003 HC RX IP 258 OP 636: Performed by: INTERNAL MEDICINE

## 2024-01-01 PROCEDURE — 85025 COMPLETE CBC W/AUTO DIFF WBC: CPT | Mod: ORL | Performed by: NURSE PRACTITIONER

## 2024-01-01 PROCEDURE — 85025 COMPLETE CBC W/AUTO DIFF WBC: CPT | Performed by: EMERGENCY MEDICINE

## 2024-01-01 PROCEDURE — 85027 COMPLETE CBC AUTOMATED: CPT | Performed by: INTERNAL MEDICINE

## 2024-01-01 PROCEDURE — 80053 COMPREHEN METABOLIC PANEL: CPT | Performed by: EMERGENCY MEDICINE

## 2024-01-01 PROCEDURE — 87637 SARSCOV2&INF A&B&RSV AMP PRB: CPT | Performed by: PHYSICIAN ASSISTANT

## 2024-01-01 PROCEDURE — 36415 COLL VENOUS BLD VENIPUNCTURE: CPT | Mod: ORL | Performed by: NURSE PRACTITIONER

## 2024-01-01 PROCEDURE — 36415 COLL VENOUS BLD VENIPUNCTURE: CPT | Performed by: HOSPITALIST

## 2024-01-01 PROCEDURE — 82040 ASSAY OF SERUM ALBUMIN: CPT | Performed by: EMERGENCY MEDICINE

## 2024-01-01 PROCEDURE — 96360 HYDRATION IV INFUSION INIT: CPT

## 2024-01-01 PROCEDURE — 70450 CT HEAD/BRAIN W/O DYE: CPT | Mod: MG

## 2024-01-01 PROCEDURE — 999N000111 HC STATISTIC OT IP EVAL DEFER

## 2024-01-01 PROCEDURE — 99223 1ST HOSP IP/OBS HIGH 75: CPT | Performed by: PHYSICIAN ASSISTANT

## 2024-01-01 PROCEDURE — 96365 THER/PROPH/DIAG IV INF INIT: CPT

## 2024-01-01 PROCEDURE — 99232 SBSQ HOSP IP/OBS MODERATE 35: CPT | Performed by: INTERNAL MEDICINE

## 2024-01-01 PROCEDURE — 250N000012 HC RX MED GY IP 250 OP 636 PS 637: Performed by: PHYSICIAN ASSISTANT

## 2024-01-01 PROCEDURE — 99285 EMERGENCY DEPT VISIT HI MDM: CPT | Mod: 25

## 2024-01-01 PROCEDURE — 83615 LACTATE (LD) (LDH) ENZYME: CPT | Performed by: INTERNAL MEDICINE

## 2024-01-01 PROCEDURE — 36415 COLL VENOUS BLD VENIPUNCTURE: CPT | Performed by: EMERGENCY MEDICINE

## 2024-01-01 PROCEDURE — 87040 BLOOD CULTURE FOR BACTERIA: CPT | Performed by: PHYSICIAN ASSISTANT

## 2024-01-01 PROCEDURE — 81001 URINALYSIS AUTO W/SCOPE: CPT | Mod: ORL | Performed by: NURSE PRACTITIONER

## 2024-01-01 PROCEDURE — 84145 PROCALCITONIN (PCT): CPT | Performed by: PHYSICIAN ASSISTANT

## 2024-01-01 PROCEDURE — 99291 CRITICAL CARE FIRST HOUR: CPT | Mod: 25

## 2024-01-01 RX ORDER — BETAMETHASONE DIPROPIONATE 0.5 MG/G
LOTION TOPICAL 2 TIMES DAILY
Status: SHIPPED
Start: 2024-01-01

## 2024-01-01 RX ORDER — CEFTRIAXONE 2 G/1
2 INJECTION, POWDER, FOR SOLUTION INTRAMUSCULAR; INTRAVENOUS EVERY 24 HOURS
Status: DISCONTINUED | OUTPATIENT
Start: 2024-01-01 | End: 2024-01-01 | Stop reason: HOSPADM

## 2024-01-01 RX ORDER — AMOXICILLIN AND CLAVULANATE POTASSIUM 500; 125 MG/1; MG/1
1 TABLET, FILM COATED ORAL 2 TIMES DAILY
Qty: 8 TABLET | Refills: 0 | DISCHARGE
Start: 2024-01-01 | End: 2024-01-01

## 2024-01-01 RX ORDER — ANORECTAL OINTMENT 15.7; .44; 24; 20.6 G/100G; G/100G; G/100G; G/100G
OINTMENT TOPICAL 4 TIMES DAILY
Qty: 113 G | Refills: 11 | Status: SHIPPED | OUTPATIENT
Start: 2024-01-01

## 2024-01-01 RX ORDER — AMOXICILLIN 250 MG
1 CAPSULE ORAL 2 TIMES DAILY PRN
Status: DISCONTINUED | OUTPATIENT
Start: 2024-01-01 | End: 2024-01-01 | Stop reason: HOSPADM

## 2024-01-01 RX ORDER — SODIUM CHLORIDE 9 MG/ML
INJECTION, SOLUTION INTRAVENOUS CONTINUOUS
Status: DISCONTINUED | OUTPATIENT
Start: 2024-01-01 | End: 2024-01-01

## 2024-01-01 RX ORDER — DEXTROSE MONOHYDRATE 25 G/50ML
25-50 INJECTION, SOLUTION INTRAVENOUS
Status: DISCONTINUED | OUTPATIENT
Start: 2024-01-01 | End: 2024-01-01 | Stop reason: HOSPADM

## 2024-01-01 RX ORDER — ANORECTAL OINTMENT 15.7; .44; 24; 20.6 G/100G; G/100G; G/100G; G/100G
OINTMENT TOPICAL 4 TIMES DAILY
Status: SHIPPED
Start: 2024-01-01 | End: 2024-01-01

## 2024-01-01 RX ORDER — ONDANSETRON 4 MG/1
4 TABLET, ORALLY DISINTEGRATING ORAL EVERY 6 HOURS PRN
Status: DISCONTINUED | OUTPATIENT
Start: 2024-01-01 | End: 2024-01-01 | Stop reason: HOSPADM

## 2024-01-01 RX ORDER — METFORMIN HCL 500 MG
500 TABLET, EXTENDED RELEASE 24 HR ORAL
Start: 2024-01-01

## 2024-01-01 RX ORDER — BETAMETHASONE DIPROPIONATE 0.05 %
OINTMENT (GRAM) TOPICAL
Status: ON HOLD | COMMUNITY
Start: 2023-01-01 | End: 2024-01-01

## 2024-01-01 RX ORDER — METFORMIN HCL 500 MG
500 TABLET, EXTENDED RELEASE 24 HR ORAL 2 TIMES DAILY
Qty: 60 TABLET | Refills: 11 | Status: ON HOLD | OUTPATIENT
Start: 2024-01-01 | End: 2024-01-01

## 2024-01-01 RX ORDER — NIACINAMIDE 500 MG
500 TABLET ORAL 2 TIMES DAILY
Status: DISCONTINUED | OUTPATIENT
Start: 2024-01-01 | End: 2024-01-01 | Stop reason: HOSPADM

## 2024-01-01 RX ORDER — ASPIRIN 81 MG/1
81 TABLET, CHEWABLE ORAL DAILY
Status: DISCONTINUED | OUTPATIENT
Start: 2024-01-01 | End: 2024-01-01 | Stop reason: HOSPADM

## 2024-01-01 RX ORDER — LANCETS 23 GAUGE
1 EACH MISCELLANEOUS
COMMUNITY
End: 2024-01-01

## 2024-01-01 RX ORDER — NICOTINE POLACRILEX 4 MG
15-30 LOZENGE BUCCAL
Status: DISCONTINUED | OUTPATIENT
Start: 2024-01-01 | End: 2024-01-01 | Stop reason: HOSPADM

## 2024-01-01 RX ORDER — LOPERAMIDE HCL 2 MG
2 CAPSULE ORAL DAILY PRN
COMMUNITY

## 2024-01-01 RX ORDER — ATORVASTATIN CALCIUM 20 MG/1
20 TABLET, FILM COATED ORAL EVERY EVENING
Status: DISCONTINUED | OUTPATIENT
Start: 2024-01-01 | End: 2024-01-01 | Stop reason: HOSPADM

## 2024-01-01 RX ORDER — LANCETS 23 GAUGE
1 EACH MISCELLANEOUS
Qty: 200 EACH | Refills: 0 | Status: SHIPPED | OUTPATIENT
Start: 2024-01-01

## 2024-01-01 RX ORDER — MAGNESIUM SULFATE HEPTAHYDRATE 40 MG/ML
2 INJECTION, SOLUTION INTRAVENOUS ONCE
Status: COMPLETED | OUTPATIENT
Start: 2024-01-01 | End: 2024-01-01

## 2024-01-01 RX ORDER — ONDANSETRON 2 MG/ML
4 INJECTION INTRAMUSCULAR; INTRAVENOUS EVERY 6 HOURS PRN
Status: DISCONTINUED | OUTPATIENT
Start: 2024-01-01 | End: 2024-01-01 | Stop reason: HOSPADM

## 2024-01-01 RX ORDER — ACETAMINOPHEN 325 MG/1
650 TABLET ORAL EVERY 4 HOURS PRN
Status: DISCONTINUED | OUTPATIENT
Start: 2024-01-01 | End: 2024-01-01 | Stop reason: HOSPADM

## 2024-01-01 RX ORDER — PIPERACILLIN SODIUM, TAZOBACTAM SODIUM 4; .5 G/20ML; G/20ML
4.5 INJECTION, POWDER, LYOPHILIZED, FOR SOLUTION INTRAVENOUS ONCE
Status: COMPLETED | OUTPATIENT
Start: 2024-01-01 | End: 2024-01-01

## 2024-01-01 RX ORDER — CEFPODOXIME PROXETIL 200 MG/1
200 TABLET, FILM COATED ORAL 2 TIMES DAILY
COMMUNITY
Start: 2024-01-01 | End: 2024-01-01

## 2024-01-01 RX ORDER — AMOXICILLIN 250 MG
2 CAPSULE ORAL 2 TIMES DAILY PRN
Status: DISCONTINUED | OUTPATIENT
Start: 2024-01-01 | End: 2024-01-01 | Stop reason: HOSPADM

## 2024-01-01 RX ORDER — LIDOCAINE 40 MG/G
CREAM TOPICAL
Status: DISCONTINUED | OUTPATIENT
Start: 2024-01-01 | End: 2024-01-01 | Stop reason: HOSPADM

## 2024-01-01 RX ADMIN — INSULIN ASPART 1 UNITS: 100 INJECTION, SOLUTION INTRAVENOUS; SUBCUTANEOUS at 17:45

## 2024-01-01 RX ADMIN — ATORVASTATIN CALCIUM 20 MG: 20 TABLET, FILM COATED ORAL at 20:47

## 2024-01-01 RX ADMIN — ASPIRIN 81 MG CHEWABLE TABLET 81 MG: 81 TABLET CHEWABLE at 07:56

## 2024-01-01 RX ADMIN — Medication 500 MG: at 20:16

## 2024-01-01 RX ADMIN — SODIUM CHLORIDE 500 ML: 9 INJECTION, SOLUTION INTRAVENOUS at 20:00

## 2024-01-01 RX ADMIN — Medication 500 MG: at 20:58

## 2024-01-01 RX ADMIN — CEFTRIAXONE 2 G: 2 INJECTION, POWDER, FOR SOLUTION INTRAMUSCULAR; INTRAVENOUS at 17:15

## 2024-01-01 RX ADMIN — ASPIRIN 81 MG CHEWABLE TABLET 81 MG: 81 TABLET CHEWABLE at 09:07

## 2024-01-01 RX ADMIN — CEFTRIAXONE 2 G: 2 INJECTION, POWDER, FOR SOLUTION INTRAMUSCULAR; INTRAVENOUS at 17:39

## 2024-01-01 RX ADMIN — INSULIN ASPART 1 UNITS: 100 INJECTION, SOLUTION INTRAVENOUS; SUBCUTANEOUS at 08:52

## 2024-01-01 RX ADMIN — SODIUM CHLORIDE: 9 INJECTION, SOLUTION INTRAVENOUS at 04:40

## 2024-01-01 RX ADMIN — INSULIN ASPART 2 UNITS: 100 INJECTION, SOLUTION INTRAVENOUS; SUBCUTANEOUS at 11:32

## 2024-01-01 RX ADMIN — INSULIN ASPART 1 UNITS: 100 INJECTION, SOLUTION INTRAVENOUS; SUBCUTANEOUS at 16:07

## 2024-01-01 RX ADMIN — INSULIN ASPART 1 UNITS: 100 INJECTION, SOLUTION INTRAVENOUS; SUBCUTANEOUS at 09:44

## 2024-01-01 RX ADMIN — CEFTRIAXONE 2 G: 2 INJECTION, POWDER, FOR SOLUTION INTRAMUSCULAR; INTRAVENOUS at 18:47

## 2024-01-01 RX ADMIN — SODIUM CHLORIDE: 9 INJECTION, SOLUTION INTRAVENOUS at 17:25

## 2024-01-01 RX ADMIN — ACETAMINOPHEN 650 MG: 325 TABLET, FILM COATED ORAL at 20:47

## 2024-01-01 RX ADMIN — Medication 500 MG: at 09:42

## 2024-01-01 RX ADMIN — MAGNESIUM SULFATE HEPTAHYDRATE 2 G: 2 INJECTION, SOLUTION INTRAVENOUS at 10:59

## 2024-01-01 RX ADMIN — SODIUM CHLORIDE: 9 INJECTION, SOLUTION INTRAVENOUS at 10:57

## 2024-01-01 RX ADMIN — CEFTRIAXONE 2 G: 2 INJECTION, POWDER, FOR SOLUTION INTRAMUSCULAR; INTRAVENOUS at 17:44

## 2024-01-01 RX ADMIN — ATORVASTATIN CALCIUM 20 MG: 20 TABLET, FILM COATED ORAL at 20:58

## 2024-01-01 RX ADMIN — PIPERACILLIN AND TAZOBACTAM 4.5 G: 4; .5 INJECTION, POWDER, FOR SOLUTION INTRAVENOUS at 15:31

## 2024-01-01 RX ADMIN — SODIUM CHLORIDE: 9 INJECTION, SOLUTION INTRAVENOUS at 01:06

## 2024-01-01 RX ADMIN — INSULIN ASPART 3 UNITS: 100 INJECTION, SOLUTION INTRAVENOUS; SUBCUTANEOUS at 17:20

## 2024-01-01 RX ADMIN — ASPIRIN 81 MG CHEWABLE TABLET 81 MG: 81 TABLET CHEWABLE at 09:41

## 2024-01-01 RX ADMIN — Medication 500 MG: at 19:47

## 2024-01-01 RX ADMIN — Medication 500 MG: at 20:47

## 2024-01-01 RX ADMIN — Medication 500 MG: at 09:07

## 2024-01-01 RX ADMIN — SODIUM CHLORIDE 250 ML: 9 INJECTION, SOLUTION INTRAVENOUS at 14:59

## 2024-01-01 RX ADMIN — SODIUM CHLORIDE 1000 ML: 9 INJECTION, SOLUTION INTRAVENOUS at 14:39

## 2024-01-01 RX ADMIN — Medication 500 MG: at 07:56

## 2024-01-01 RX ADMIN — INSULIN ASPART 1 UNITS: 100 INJECTION, SOLUTION INTRAVENOUS; SUBCUTANEOUS at 17:56

## 2024-01-01 RX ADMIN — Medication 500 MG: at 08:51

## 2024-01-01 RX ADMIN — ATORVASTATIN CALCIUM 20 MG: 20 TABLET, FILM COATED ORAL at 19:47

## 2024-01-01 RX ADMIN — ASPIRIN 81 MG CHEWABLE TABLET 81 MG: 81 TABLET CHEWABLE at 08:51

## 2024-01-01 RX ADMIN — INSULIN ASPART 1 UNITS: 100 INJECTION, SOLUTION INTRAVENOUS; SUBCUTANEOUS at 08:00

## 2024-01-01 ASSESSMENT — ACTIVITIES OF DAILY LIVING (ADL)
ADLS_ACUITY_SCORE: 52
ADLS_ACUITY_SCORE: 52
ADLS_ACUITY_SCORE: 56
ADLS_ACUITY_SCORE: 56
ADLS_ACUITY_SCORE: 48
ADLS_ACUITY_SCORE: 56
ADLS_ACUITY_SCORE: 56
ADLS_ACUITY_SCORE: 52
ADLS_ACUITY_SCORE: 56
ADLS_ACUITY_SCORE: 52
ADLS_ACUITY_SCORE: 56
ADLS_ACUITY_SCORE: 56
ADLS_ACUITY_SCORE: 48
ADLS_ACUITY_SCORE: 52
ADLS_ACUITY_SCORE: 35
ADLS_ACUITY_SCORE: 56
ADLS_ACUITY_SCORE: 56
ADLS_ACUITY_SCORE: 52
ADLS_ACUITY_SCORE: 56
ADLS_ACUITY_SCORE: 35
ADLS_ACUITY_SCORE: 44
ADLS_ACUITY_SCORE: 56
ADLS_ACUITY_SCORE: 52
ADLS_ACUITY_SCORE: 35
ADLS_ACUITY_SCORE: 52
ADLS_ACUITY_SCORE: 56
ADLS_ACUITY_SCORE: 56
ADLS_ACUITY_SCORE: 35
ADLS_ACUITY_SCORE: 56
ADLS_ACUITY_SCORE: 44
ADLS_ACUITY_SCORE: 44
ADLS_ACUITY_SCORE: 52
ADLS_ACUITY_SCORE: 56
ADLS_ACUITY_SCORE: 56
ADLS_ACUITY_SCORE: 52
ADLS_ACUITY_SCORE: 48
ADLS_ACUITY_SCORE: 56
ADLS_ACUITY_SCORE: 52
ADLS_ACUITY_SCORE: 52
ADLS_ACUITY_SCORE: 35
ADLS_ACUITY_SCORE: 48
ADLS_ACUITY_SCORE: 56
ADLS_ACUITY_SCORE: 44
ADLS_ACUITY_SCORE: 56
ADLS_ACUITY_SCORE: 44
ADLS_ACUITY_SCORE: 52
ADLS_ACUITY_SCORE: 52
ADLS_ACUITY_SCORE: 56
ADLS_ACUITY_SCORE: 56
ADLS_ACUITY_SCORE: 52
ADLS_ACUITY_SCORE: 56
ADLS_ACUITY_SCORE: 35
ADLS_ACUITY_SCORE: 44
ADLS_ACUITY_SCORE: 51
ADLS_ACUITY_SCORE: 51
ADLS_ACUITY_SCORE: 56
ADLS_ACUITY_SCORE: 56
DEPENDENT_IADLS:: CLEANING;COOKING;LAUNDRY;SHOPPING;MEAL PREPARATION;MEDICATION MANAGEMENT;MONEY MANAGEMENT;TRANSPORTATION
ADLS_ACUITY_SCORE: 52
ADLS_ACUITY_SCORE: 56
ADLS_ACUITY_SCORE: 35
ADLS_ACUITY_SCORE: 56
ADLS_ACUITY_SCORE: 52
ADLS_ACUITY_SCORE: 56
ADLS_ACUITY_SCORE: 35
ADLS_ACUITY_SCORE: 51
ADLS_ACUITY_SCORE: 56
ADLS_ACUITY_SCORE: 52
ADLS_ACUITY_SCORE: 44
ADLS_ACUITY_SCORE: 35
ADLS_ACUITY_SCORE: 56
ADLS_ACUITY_SCORE: 44
ADLS_ACUITY_SCORE: 56
ADLS_ACUITY_SCORE: 44
ADLS_ACUITY_SCORE: 56

## 2024-01-01 ASSESSMENT — COLUMBIA-SUICIDE SEVERITY RATING SCALE - C-SSRS
1. IN THE PAST MONTH, HAVE YOU WISHED YOU WERE DEAD OR WISHED YOU COULD GO TO SLEEP AND NOT WAKE UP?: NO
6. HAVE YOU EVER DONE ANYTHING, STARTED TO DO ANYTHING, OR PREPARED TO DO ANYTHING TO END YOUR LIFE?: NO
2. HAVE YOU ACTUALLY HAD ANY THOUGHTS OF KILLING YOURSELF IN THE PAST MONTH?: NO

## 2024-01-02 NOTE — TELEPHONE ENCOUNTER
[facility] [place] [facility] Triage Nurse Telephone Encounter    Provider: EDIE Laguerre CNP   Facility: [facility]  Facility Type:  AL    Caller: Janey  Call Back Number: [phone]    Allergies:  No Known Allergies     Reason for call:         Provider giving Order:  EDIE Laguerre CNP     Verbal Order given to: Janey Duenas RN

## 2024-01-03 NOTE — PROGRESS NOTES
[facility] Geriatrics   January 3, 2024     Name: Nick Cameron   : 1933       Orders:  - Referral to home care PT, OT to eval/treat equipment needs, transfers, cognition  - Recheck weight; update provider with results        Electronically signed by   EDIE Sibley CNP on 1/3/2024 at 5:04 PM

## 2024-01-03 NOTE — PROGRESS NOTES
"[facility] GERIATRICS    Chief Complaint   Patient presents with    RECHECK     HPI:  Nick Cameron is a 89 year old  (7/30/1933), who is being seen today for an episodic care visit at: [facility] (S) [[id number]].       Today's concern is:     Patient seen today due to new concern regarding skin tears to toes.     During exam, patient seen sitting in wheelchair. HPI limited due to cognitive impairment. Primarily answers questions with 1-2 word responses. Denies pain or concerns today. Admits to good appetite. Sleeping well at night. Denies chest pain, SOB, headache, syncope.      Allergies, and PMH/PSH reviewed in [facility] today.    REVIEW OF SYSTEMS:  Limited secondary to cognitive impairment but today pt reports no additional concerns      Objective:   /75   Pulse 95   Temp 97.3  F (36.3  C)   Resp 18   Ht 1.854 m (6' 1\")   Wt 88.5 kg (195 lb)   SpO2 97%   BMI 25.73 kg/m    GENERAL APPEARANCE:  Alert, in no distress, cooperative  RESP:  no respiratory distress  CV: no edema  M/S:   Gait and station abnormal, sitting in wheelchair.  SKIN:  Inspection of skin and subcutaneous tissue baseline, Palpation of skin and subcutaneous tissue baseline. Skin tears to right 2nd and 3rd toes w/bandages CDI, no sx of infection. Skin abrasions to L 2nd and 3rd toes.   NEURO:   Cranial nerves 2-12 are normal tested and grossly at patient's baseline, Examination of sensation by touch normal  PSYCH:  Oriented to self; disoriented to place and date/year. , memory impaired , affect and mood normal. Difficult for patient to track conversations.      Recent labs in [facility] reviewed by me today.    Most Recent 3 CBC's:  Recent Labs   Lab Test 12/20/23  1430 06/28/23  0952   WBC 8.6 6.6   HGB 11.6* 12.1*   MCV 99 98   * 138*     Most Recent 3 BMP's:  Recent Labs   Lab Test 12/20/23  1430 06/28/23  0952    144   POTASSIUM 4.2 4.0   CHLORIDE 102 106   CO2 24 26   BUN 20.6 23.5*   CR " 1.06 1.06   ANIONGAP 13 12   CURT 9.1 9.5   * 183*       Lab Results   Component Value Date    A1C 7.7 12/20/2023    A1C 6.5 06/28/2023         Assessment/Plan:    (F03.B0) Moderate dementia without behavioral disturbance, psychotic disturbance, mood disturbance, or anxiety, unspecified dementia type (H)  (primary encounter diagnosis)  (R53.81) Physical deconditioning  Comment: Progression of dementia, less communicative with ongoing physical deconditioning. Primarily wheelchair bound, able to stand and pivot for transfers.   Plan:   - Referral to home care PT, OT to eval/treat equipment needs, transfers, cognition  - Recheck weight; update provider with results  - Monitor changes in mood or behaviors  - Continue supportive services at [facility] memory care unit  - Reviewed patient status and treatment plan with Tammy (daughter). Discussed ongoing physical and cognitive decline. Reports having more difficulty with transfers, sleeping more throughout the day. Also more difficulty tracking conversations. Declines Neurology referral; discussed based on current status, does correlate with dementia/major neurocognitive disorder. Goals of care are comfort focused.     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   Plan:   - Continue wound care  - Patient to follow-up with Dermatologist as directed    (E11.9) Type 2 diabetes mellitus without complication, without long-term current use of insulin (H)  Comment: Last A1c 7.7% on 12/20/23. A1c goal < 8% for older adults.   Lab Results   Component Value Date    A1C 7.7 12/20/2023    A1C 6.5 06/28/2023     Plan:   - Continue metformin 1000mg ER at bedtime   - Recheck A1c in  03/2024      Orders:  - Referral to home care PT, OT to eval/treat equipment needs, transfers, cognition  - Recheck weight; update provider with results      Electronically signed by: EDIE Sibley CNP

## 2024-01-03 NOTE — LETTER
"    1/3/2024        RE: Nick Cameron  [facility]  [address]  [facility] [address]        [facility] GERIATRICS    Chief Complaint   Patient presents with    RECHECK     HPI:  Nick Cameron is a 89 year old  (7/30/1933), who is being seen today for an episodic care visit at: [facility] ([facility]) [[id number]].       Today's concern is:     Patient seen today due to new concern regarding skin tears to toes.     During exam, patient seen sitting in wheelchair. HPI limited due to cognitive impairment. Primarily answers questions with 1-2 word responses. Denies pain or concerns today. Admits to good appetite. Sleeping well at night. Denies chest pain, SOB, headache, syncope.      Allergies, and PMH/PSH reviewed in [facility] today.    REVIEW OF SYSTEMS:  Limited secondary to cognitive impairment but today pt reports no additional concerns      Objective:   /75   Pulse 95   Temp 97.3  F (36.3  C)   Resp 18   Ht 1.854 m (6' 1\")   Wt 88.5 kg (195 lb)   SpO2 97%   BMI 25.73 kg/m    GENERAL APPEARANCE:  Alert, in no distress, cooperative  RESP:  no respiratory distress  CV: no edema  M/S:   Gait and station abnormal, sitting in wheelchair.  SKIN:  Inspection of skin and subcutaneous tissue baseline, Palpation of skin and subcutaneous tissue baseline. Skin tears to right 2nd and 3rd toes w/bandages CDI, no sx of infection. Skin abrasions to L 2nd and 3rd toes.   NEURO:   Cranial nerves 2-12 are normal tested and grossly at patient's baseline, Examination of sensation by touch normal  PSYCH:  Oriented to self; disoriented to place and date/year. , memory impaired , affect and mood normal. Difficult for patient to track conversations.      Recent labs in [facility] reviewed by me today.    Most Recent 3 CBC's:  Recent Labs   Lab Test 12/20/23  1430 06/28/23  0952   WBC 8.6 6.6   HGB 11.6* 12.1*   MCV 99 98   * 138*     Most Recent 3 BMP's:  Recent Labs   Lab Test 12/20/23  1430 " 06/28/23  0952    144   POTASSIUM 4.2 4.0   CHLORIDE 102 106   CO2 24 26   BUN 20.6 23.5*   CR 1.06 1.06   ANIONGAP 13 12   CURT 9.1 9.5   * 183*       Lab Results   Component Value Date    A1C 7.7 12/20/2023    A1C 6.5 06/28/2023         Assessment/Plan:    (F03.B0) Moderate dementia without behavioral disturbance, psychotic disturbance, mood disturbance, or anxiety, unspecified dementia type (H)  (primary encounter diagnosis)  (R53.81) Physical deconditioning  Comment: Progression of dementia, less communicative with ongoing physical deconditioning. Primarily wheelchair bound, able to stand and pivot for transfers.   Plan:   - Referral to home care PT, OT to eval/treat equipment needs, transfers, cognition  - Recheck weight; update provider with results  - Monitor changes in mood or behaviors  - Continue supportive services at [facility] memory care unit  - Reviewed patient status and treatment plan with Tammy (daughter). Discussed ongoing physical and cognitive decline. Reports having more difficulty with transfers, sleeping more throughout the day. Also more difficulty tracking conversations. Declines Neurology referral; discussed based on current status, does correlate with dementia/major neurocognitive disorder. Goals of care are comfort focused.     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   Plan:   - Continue wound care  - Patient to follow-up with Dermatologist as directed    (E11.9) Type 2 diabetes mellitus without complication, without long-term current use of insulin (H)  Comment: Last A1c 7.7% on 12/20/23. A1c goal < 8% for older adults.   Lab Results   Component Value Date    A1C 7.7  2023    A1C 6.5 2023     Plan:   - Continue metformin 1000mg ER at bedtime   - Recheck A1c in 2024      Orders:  - Referral to home care PT, OT to eval/treat equipment needs, transfers, cognition  - Recheck weight; update provider with results      Electronically signed by: EDIE Sibley CNP              [facility]   January 3, 2024     Name: Nick Cameron   : 1933       Orders:  - Referral to home care PT, OT to eval/treat equipment needs, transfers, cognition  - Recheck weight; update provider with results        Electronically signed by   EDIE Sibley CNP on 1/3/2024 at 5:04 PM          Sincerely,        EDIE Sibley CNP

## 2024-01-18 NOTE — TELEPHONE ENCOUNTER
[facility] Geriatrics Triage Nurse Telephone Encounter    Provider: EDIE Laguerre CNP   Facility: [facility]  Facility Type:  AL    Caller: Allison  Call Back Number: [phone]    Allergies:  No Known Allergies     Reason for call: Nurse is calling to report that patient is more confused and is having urinary frequency and urgency.  He's also noted to be voiding small amounts.  No fever noted.      Verbal Order/Direction given by Provider: Obtain a UA/UC.      Provider giving Order:  EDIE Laguerre CNP     Verbal Order given to: Rosa Maria Duenas RN

## 2024-01-19 NOTE — TELEPHONE ENCOUNTER
[facility] Triage Nurse Telephone Encounter    Provider: EDIE Laguerre CNP   Facility: [facility]  Facility Type:  AL    Caller: Janey  Call Back Number: [phone]    Allergies:  No Known Allergies     Reason for call: Nurse is calling to report that patient was found lying on the floor with his head on a pillow and covered in his blanket.  They suspect patient was on the floor slightly less than an hour because staff had checked on him prior.  VS are stable.  He does have some bruises on his left forearm, but denies pain and ROM is intact.      Verbal Order/Direction given by Provider: Continue to monitor per protocol and update with changes.  Check Heme 1 and BMP on 1/24/24.      Provider giving Order:  EDIE Laguerre CNP     Verbal Order given to: Janey Duenas RN

## 2024-01-19 NOTE — TELEPHONE ENCOUNTER
[facility] Geriatrics Lab Note     Provider: EDIE Laguerre CNP   Facility: [facility]  Facility Type:  AL    No Known Allergies    Labs Reviewed by provider: OSWALDO     Verbal Order/Direction given by Provider: Add cefpodoxime 200mg BID x 10 days dx UTI. UC results pending   Please update daughter w/above plan     Provider giving Order:  EDIE Laguerre CNP     Verbal Order given to: Janey([phone])    Delvis Duenas RN

## 2024-01-22 NOTE — TELEPHONE ENCOUNTER
[facility] [place] [facility] Triage Nurse Telephone Encounter    Provider: EDIE Laguerre CNP   Facility: [facility]  Facility Type:  AL      Call Back Number: [phone]    Allergies:  No Known Allergies     Reason for call: New orders per NP    Verbal Order/Direction given by Provider: Discontinue antibiotic, UC negative     Provider giving Order:  EDIE Laguerre CNP     Verbal Order given to: Janey([phone])    Delvis Duenas RN

## 2024-01-23 NOTE — TELEPHONE ENCOUNTER
[facility] Triage Nurse Telephone Encounter    Provider: EDIE Laguerre CNP   Facility: [facility]  Facility Type:  AL    Caller: Siva  Call Back Number: [phone]    Allergies:  No Known Allergies     Reason for call: Pt was found on the floor this morning sleeping. his vitals at the time: 122/60, P 79, T 101.1, O2 99%. COVID swabbed - negative. Pt is eating less, more weak than baseline and needing more assistance from staff overall.    Spoke with family and they are wanting pt assessed at ED then are looking for more comfort focused care; possible hospice. PCP updated and in agreement.     Elsa Zepeda RN

## 2024-01-23 NOTE — ED PROVIDER NOTES
History     Chief Complaint:  Altered Mental Status       HPI   Nick Cameron is a 89 year old male arrives via EMS from his assisted living facility for evaluation of decreased level of verbal interaction and generalized weakness.  He apparently is coming from the locked unit at a memory care facility, though does not have a history of dementia.  His paperwork does indicate that he is DNR/DNI and on hospice care.  I was asked to evaluate him for a possible code stroke.  He is alert, following commands, and answering basic questions appropriately.  He in fact has no complaints.    I later learned from patient's daughter once she arrived, the patient had experienced a fall last night.      Independent Historian:    Daughter reports that the care facility informed him that patient had a fall last night and did also receive some ibuprofen late this morning for a possible fever.  She also reports a recent partial course of antibiotics for possible urinary tract infection, but that these were discontinued after urine culture returns negative.        Medications:    acetaminophen (MAPAP) 500 MG CAPS  aspirin (ASPIRIN 81) 81 MG chewable tablet  atorvastatin (LIPITOR) 20 MG tablet  diltiazem ER (DILT-XR) 120 MG 24 hr capsule  Emollient (CERAVE) CREA  fluocinonide (LIDEX) 0.05 % external cream  ibuprofen (ADVIL/MOTRIN) 400 MG tablet  ketoconazole (NIZORAL) 2 % external shampoo  metFORMIN (GLUCOPHAGE XR) 500 MG 24 hr tablet  niacinamide 500 MG tablet        Past Medical History:    Past Medical History:   Diagnosis Date    BPH (benign prostatic hyperplasia)     DM2 (diabetes mellitus, type 2) (H)     Gout     HLD (hyperlipidemia)     HTN (hypertension)     Malignant neoplasm of skin        Past Surgical History:    Past Surgical History:   Procedure Laterality Date    CATARACT EXTRACTION W/ INTRAOCULAR LENS IMPLANT      MOHS MICROGRAPHIC PROCEDURE      REPAIR MOHS            Physical Exam   Patient Vitals for the past 24  hrs:   BP Pulse Resp SpO2   01/23/24 1301 91/53 90 16 94 %        Physical Exam  General: Patient is alert and cooperative.  He is exhibiting no sign of either physical or respiratory distress.  HENT: There is no facial weakness or asymmetry  Eyes: EOMI. Normal conjunctiva.  Neck:  Normal range of motion and appearance.   Cardiovascular:  Normal rate, regular rhythm and normal heart sounds.   Pulmonary/Chest:  Effort normal. No wheezing or crackles.  Abdominal: Soft. No distension or tenderness.     Musculoskeletal: Normal range of motion. No edema or tenderness.   Neurological: oriented to person.  He states that his name is Bhavik.  He knows that he is in a hospital.  He is thinks that he is 83 years old.  Normal strength, sensation, and coordination.  There are no focal findings.  Skin: Warm and dry. No rash or bruising.   Psychiatric: No apparent psychosis.      Emergency Department Course     Imaging:  CT Head w/o Contrast   Final Result   IMPRESSION: No acute intracranial pathology.       WARREN TYLER MD            SYSTEM ID:  [id number]      XR Chest Port 1 View    (Results Pending)       Report per radiology    Laboratory:  Labs Ordered and Resulted from Time of ED Arrival to Time of ED Departure   COMPREHENSIVE METABOLIC PANEL - Abnormal       Result Value    Sodium 135      Potassium 4.6      Carbon Dioxide (CO2) 23      Anion Gap 10      Urea Nitrogen 25.9 (*)     Creatinine 1.48 (*)     GFR Estimate 45 (*)     Calcium 8.6      Chloride 102      Glucose 225 (*)     Alkaline Phosphatase 124      AST 28      ALT 11      Protein Total 6.3 (*)     Albumin 3.1 (*)     Bilirubin Total 1.4 (*)    CBC WITH PLATELETS AND DIFFERENTIAL - Abnormal    WBC Count 11.2 (*)     RBC Count 3.31 (*)     Hemoglobin 11.2 (*)     Hematocrit 32.7 (*)     MCV 99      MCH 33.8 (*)     MCHC 34.3      RDW 15.2 (*)     Platelet Count 161      % Neutrophils 71      % Lymphocytes 18      % Monocytes 10      % Eosinophils 0      %  Basophils 0      % Immature Granulocytes 1      NRBCs per 100 WBC 0      Absolute Neutrophils 8.0      Absolute Lymphocytes 2.0      Absolute Monocytes 1.1      Absolute Eosinophils 0.0      Absolute Basophils 0.0      Absolute Immature Granulocytes 0.1      Absolute NRBCs 0.0     GLUCOSE BY METER - Abnormal    GLUCOSE BY METER POCT 206 (*)    INFLUENZA A/B, RSV, & SARS-COV2 PCR - Normal    Influenza A PCR Negative      Influenza B PCR Negative      RSV PCR Negative      SARS CoV2 PCR Negative         Procedures    Emergency Department Course & Assessments:    Patient's daughter has arrived and was appraised of ED imaging and lab test results.  She is reporting that there was a history of fall last night that a different family member was notified of.  Based on patient's clinical exam, there is no indication for any additional imaging.  Daughter is comfortable with discharge back to memory care unit.        Interventions:         Disposition:  The patient was discharged to home.     Impression & Plan    Medical Decision Making:  Afebrile and hemodynamically stable 89-year-old male arrives from an assisted living facility for evaluation of reports of concerns about generalized weakness, a fall last night, and less interactive than normal.  Patient comes from a memory care unit but does not have a history of dementia.  He is alert and cooperative with no complaints on arrival.  Workup has been reassuring.  He has no external sign of trauma or any pain complaints.  Imaging was limited to a noncontrast head CT which was negative.  COVID and influenza tests are negative.  Other labs include white blood cell count of 11.2.  A CMP shows no electrolyte derangements.  His BUN is 25.9, creatinine 1.48, suggestive of mild dehydration he is receiving a small IV fluid bolus.  Serum glucose is 225.  I discussed clinical presentation and test results with his daughter who is comfortable with returning back to his assisted living  facility via medical transport.  He is DNR/DNI and there is no indication for hospitalizing or further workup no evidence clinically or historically to suggest a serious infectious process or to drive further workup.    Diagnosis:    ICD-10-CM    1. Fall, initial encounter  W19.XXXA       2. Weakness generalized  R53.1            Discharge Medications:  New Prescriptions    No medications on file          Gil Tripathi MD  1/23/2024   Gil Tripathi MD Isaacson, Brian A, MD  01/25/24 1306

## 2024-01-23 NOTE — ED TRIAGE NOTES
Pt comes in via EMS with decreased alertness and weakness.  When arrived was unable to answer anything other than yes/no questions.  Pt now able to answer some questions.  Pt was given tylenol at the senior living home for a temp 101.  Per EMS he was given a home COVID/Flu test that was negative.  Stroke eval called on arrival     Triage Assessment (Adult)       Row Name 01/23/24 1302          Triage Assessment    Airway WDL WDL        Respiratory WDL    Respiratory WDL WDL        Skin Circulation/Temperature WDL    Skin Circulation/Temperature WDL WDL        Cardiac WDL    Cardiac WDL WDL        Peripheral/Neurovascular WDL    Peripheral Neurovascular WDL WDL        Cognitive/Neuro/Behavioral WDL    Cognitive/Neuro/Behavioral WDL X     Level of Consciousness confused     Orientation disoriented to;place;time;situation     Speech unable to speak

## 2024-01-24 NOTE — PROGRESS NOTES
[facility]   2024     Name: Nick Cameron   : 1933       Orders:  - Recheck patient's vital signs; update provider with results   - Check CBC, BMP on 24 dx recurrent falls, SMITHA, leukocytosis        Electronically signed by   EDIE Sibley CNP on 2024 at 12:11 PM

## 2024-01-24 NOTE — LETTER
"    1/24/2024        RE: Nick Cameron  [address]  [facility] [address]        [facility] GERIATRICS    Chief Complaint   Patient presents with     RECHECK     Emergency Department follow up     HPI:  Nick Cameron is a 89 year old  (7/30/1933), who is being seen today for an episodic care visit at: [facility] ([facility]) [[id number]].       Patient seen today to follow-up ED visit, recent fall     Per RN note from 1/19/24:  Nurse is calling to report that patient was found lying on the floor with his head on a pillow and covered in his blanket. They suspect patient was on the floor slightly less than an hour because staff had checked on him prior. VS are stable. He does have some bruises on his left forearm, but denies pain and ROM is intact.     RN note 1/23/24:  Pt was found on the floor this morning sleeping. his vitals at the time: 122/60, P 79, T 101.1, O2 99%. COVID swabbed - negative. Pt is eating less, more weak than baseline and needing more assistance from staff overall.    Spoke with family and they are wanting pt assessed at ED then are looking for more comfort focused care; possible hospice. PCP updated and in agreement.     Evaluated at [facility] ED on 1/23/24 due to falls, weakness. COVID, RSV, influenza negative. Received IV fluids due to mild dehydration. CT head and CXR negative.        Today's concern is:     During exam, patient seen sitting in wheelchair. HPI limited due to dementia. Denies pain or concerns today. Reports feeling fatigued. Admits to good appetite. Denies chest pain, SOB, headache, syncope.      Allergies, and PMH/PSH reviewed in [facility] today.    REVIEW OF SYSTEMS:  Limited secondary to cognitive impairment but today pt reports no concerns      Objective:   BP (!) 152/96   Pulse 68   Temp 97  F (36.1  C)   Resp 18   Ht 1.854 m (6' 1\")   Wt 88.5 kg (195 lb)   SpO2 92%   BMI 25.73 kg/m    GENERAL APPEARANCE:  Alert, in no distress, " cooperative  RESP:  no respiratory distress  CV: no edema  M/S:   Gait and station abnormal, sitting in wheelchair.  SKIN:  Inspection of skin and subcutaneous tissue baseline, Palpation of skin and subcutaneous tissue baseline.   NEURO:   Cranial nerves 2-12 are normal tested and grossly at patient's baseline, Examination of sensation by touch normal  PSYCH:  Oriented to self, memory impaired , affect and mood normal. Difficult for patient to track conversations.        Recent labs in [facility] reviewed by me today.   Most Recent 3 CBC's:  Recent Labs   Lab Test 01/23/24  1306 12/20/23  1430 06/28/23  0952   WBC 11.2* 8.6 6.6   HGB 11.2* 11.6* 12.1*   MCV 99 99 98    139* 138*     Most Recent 3 BMP's:  Recent Labs   Lab Test 01/23/24  1306 01/23/24  1302 12/20/23  1430 06/28/23  0952     --  139 144   POTASSIUM 4.6  --  4.2 4.0   CHLORIDE 102  --  102 106   CO2 23  --  24 26   BUN 25.9*  --  20.6 23.5*   CR 1.48*  --  1.06 1.06   ANIONGAP 10  --  13 12   CURT 8.6  --  9.1 9.5   * 206* 212* 183*       Liver Function Studies -   Recent Labs   Lab Test 01/23/24  1306   PROTTOTAL 6.3*   ALBUMIN 3.1*   BILITOTAL 1.4*   ALKPHOS 124   AST 28   ALT 11       Lab Results   Component Value Date    A1C 7.7 12/20/2023    A1C 6.5 06/28/2023       TSH   Date Value Ref Range Status   06/28/2023 2.09 0.30 - 4.20 uIU/mL Final        Latest Reference Range & Units 01/23/24 13:15   SARS CoV2 PCR Negative  Negative   Influenza A Negative  Negative   Influenza B Negative  Negative   Resp Syncytial Virus Negative  Negative       UC 1/18/24:  50,000-100,000 CFU/mL Mixture of Urogenital Angela         Assessment/Plan:    (F03.B0) Moderate dementia without behavioral disturbance, psychotic disturbance, mood disturbance, or anxiety, unspecified dementia type (H)  (primary encounter diagnosis)  (R29.6) Recurrent falls  (R53.81) Physical deconditioning  Comment: Chronic dementia w/physical deconditioning associated with recurrent  falls. Ambulates short distances w/walker. Primarily wheelchair bound.  Plan:   - Recheck patient's vital signs; update provider with results   - Check CBC, BMP on 24 dx recurrent falls, SMITHA, leukocytosis  - Reviewed patient status and treatment plan with Tammy (daughter), discussed goals of care are comfort focused.     (E11.9) Type 2 diabetes mellitus without complication, without long-term current use of insulin (H)  Comment: Controlled, last A1c 7.7% on 23. A1c goal < 8% for older adults.  Plan:  - Add BG checks twice per week: Monday before breakfast, Thursday before dinner dx diabetes  - Add lancets twice per week for BG monitoring  - Continue metformin    (N17.9) SMITHA (acute kidney injury) (H24)  Comment: SMITHA likely r/t dehydration. UA 24 appeared positive, started on course of cefpodoxime -24. UC negative on 24 and antibiotic dc'd on 24.   Plan:   - Check CBC, BMP on 24 dx recurrent falls, SMITHA, leukocytosis      Orders:  - Recheck patient's vital signs; update provider with results   - Check CBC, BMP on 24 dx recurrent falls, SMITHA, leukocytosis  UPDATE:  - Add BG checks twice per week: Monday before breakfast, Thursday before dinner dx diabetes  - Add lancets twice per week for BG monitoring        Electronically signed by: EDIE Sibley CNP               [facility]   2024     Name: Nick Cameron   : 1933       Orders:  - Recheck patient's vital signs; update provider with results   - Check CBC, BMP on 24 dx recurrent falls, SMITHA, leukocytosis        Electronically signed by   EDIE Sibley CNP on 2024 at 12:11 PM              [facility]   2024     Name: Nick Cameron   : 1933       Orders:  - Add BG checks twice per week: Monday before breakfast, Thursday before dinner dx diabetes  - Add lancets twice per week for BG monitoring      Electronically signed  by   EDIE Sibley CNP on 1/25/2024 at 12:22 PM          Sincerely,        EDIE Sibley CNP

## 2024-01-24 NOTE — PROGRESS NOTES
"[facility] GERIATRICS    Chief Complaint   Patient presents with    RECHECK     Emergency Department follow up     HPI:  Nick Cameron is a 89 year old  (7/30/1933), who is being seen today for an episodic care visit at: [facility] ([facility]) [[id number]].       Patient seen today to follow-up ED visit, recent fall     Per RN note from 1/19/24:  Nurse is calling to report that patient was found lying on the floor with his head on a pillow and covered in his blanket. They suspect patient was on the floor slightly less than an hour because staff had checked on him prior. VS are stable. He does have some bruises on his left forearm, but denies pain and ROM is intact.     RN note 1/23/24:  Pt was found on the floor this morning sleeping. his vitals at the time: 122/60, P 79, T 101.1, O2 99%. COVID swabbed - negative. Pt is eating less, more weak than baseline and needing more assistance from staff overall.    Spoke with family and they are wanting pt assessed at ED then are looking for more comfort focused care; possible hospice. PCP updated and in agreement.     Evaluated at [facility] ED on 1/23/24 due to falls, weakness. COVID, RSV, influenza negative. Received IV fluids due to mild dehydration. CT head and CXR negative.        Today's concern is:     During exam, patient seen sitting in wheelchair. HPI limited due to dementia. Denies pain or concerns today. Reports feeling fatigued. Admits to good appetite. Denies chest pain, SOB, headache, syncope.      Allergies, and PMH/PSH reviewed in [facility] today.    REVIEW OF SYSTEMS:  Limited secondary to cognitive impairment but today pt reports no concerns      Objective:   BP (!) 152/96   Pulse 68   Temp 97  F (36.1  C)   Resp 18   Ht 1.854 m (6' 1\")   Wt 88.5 kg (195 lb)   SpO2 92%   BMI 25.73 kg/m    GENERAL APPEARANCE:  Alert, in no distress, cooperative  RESP:  no respiratory distress  CV: no edema  M/S:   Gait and station abnormal, " sitting in wheelchair.  SKIN:  Inspection of skin and subcutaneous tissue baseline, Palpation of skin and subcutaneous tissue baseline.   NEURO:   Cranial nerves 2-12 are normal tested and grossly at patient's baseline, Examination of sensation by touch normal  PSYCH:  Oriented to self, memory impaired , affect and mood normal. Difficult for patient to track conversations.        Recent labs in [facility] reviewed by me today.   Most Recent 3 CBC's:  Recent Labs   Lab Test 01/23/24  1306 12/20/23  1430 06/28/23  0952   WBC 11.2* 8.6 6.6   HGB 11.2* 11.6* 12.1*   MCV 99 99 98    139* 138*     Most Recent 3 BMP's:  Recent Labs   Lab Test 01/23/24  1306 01/23/24  1302 12/20/23  1430 06/28/23  0952     --  139 144   POTASSIUM 4.6  --  4.2 4.0   CHLORIDE 102  --  102 106   CO2 23  --  24 26   BUN 25.9*  --  20.6 23.5*   CR 1.48*  --  1.06 1.06   ANIONGAP 10  --  13 12   CURT 8.6  --  9.1 9.5   * 206* 212* 183*       Liver Function Studies -   Recent Labs   Lab Test 01/23/24  1306   PROTTOTAL 6.3*   ALBUMIN 3.1*   BILITOTAL 1.4*   ALKPHOS 124   AST 28   ALT 11       Lab Results   Component Value Date    A1C 7.7 12/20/2023    A1C 6.5 06/28/2023       TSH   Date Value Ref Range Status   06/28/2023 2.09 0.30 - 4.20 uIU/mL Final        Latest Reference Range & Units 01/23/24 13:15   SARS CoV2 PCR Negative  Negative   Influenza A Negative  Negative   Influenza B Negative  Negative   Resp Syncytial Virus Negative  Negative       UC 1/18/24:  50,000-100,000 CFU/mL Mixture of Urogenital Angela         Assessment/Plan:    (F03.B0) Moderate dementia without behavioral disturbance, psychotic disturbance, mood disturbance, or anxiety, unspecified dementia type (H)  (primary encounter diagnosis)  (R29.6) Recurrent falls  (R53.81) Physical deconditioning  Comment: Chronic dementia w/physical deconditioning associated with recurrent falls. Ambulates short distances w/walker. Primarily wheelchair bound.  Plan:   - Recheck  patient's vital signs; update provider with results   - Check CBC, BMP on 1/31/24 dx recurrent falls, SMITHA, leukocytosis  - Reviewed patient status and treatment plan with Tammy (daughter), discussed goals of care are comfort focused.     (E11.9) Type 2 diabetes mellitus without complication, without long-term current use of insulin (H)  Comment: Controlled, last A1c 7.7% on 12/20/23. A1c goal < 8% for older adults.  Plan:  - Add BG checks twice per week: Monday before breakfast, Thursday before dinner dx diabetes  - Add lancets twice per week for BG monitoring  - Continue metformin    (N17.9) SMITHA (acute kidney injury) (H24)  Comment: SMITHA likely r/t dehydration. UA 1/18/24 appeared positive, started on course of cefpodoxime 1/20-1/22/24. UC negative on 1/20/24 and antibiotic dc'd on 1/22/24.   Plan:   - Check CBC, BMP on 1/31/24 dx recurrent falls, SMITHA, leukocytosis      Orders:  - Recheck patient's vital signs; update provider with results   - Check CBC, BMP on 1/31/24 dx recurrent falls, SMITHA, leukocytosis  UPDATE:  - Add BG checks twice per week: Monday before breakfast, Thursday before dinner dx diabetes  - Add lancets twice per week for BG monitoring        Electronically signed by: EDIE Sibley CNP

## 2024-01-25 NOTE — PROGRESS NOTES
[facility]   2024     Name: Nick Cameron   : 1933       Orders:  - Add BG checks twice per week: Monday before breakfast, Thursday before dinner dx diabetes  - Add lancets twice per week for BG monitoring      Electronically signed by   EDIE Sibley CNP on 2024 at 12:22 PM

## 2024-02-06 NOTE — PROGRESS NOTES
"[facility] GERIATRICS    Chief Complaint   Patient presents with    Clinic Care Coordination - Face To Face     Face to face for wheelchair, hospital bed     HPI:  Nick Cameron is a 89 year old  (7/30/1933), who is being seen today for an episodic care visit at: [facility] ([facility]) [[id number]].       Today's concern is:     Patient seen today for F2F visit for DME evaluation. Home care OT recommending wheelchair and hospital bed.     During exam, patient seen sitting in wheelchair in common area of memory care unit. HPI limited due to dementia. Denies pain or discomfort today. Admits to good appetite. Denies chest pain, SOB, headache, syncope.      Allergies, and PMH/PSH reviewed in [facility] today.    REVIEW OF SYSTEMS:  Limited secondary to cognitive impairment but today pt reports no concerns      Objective:   /66   Pulse 85   Temp 97.3  F (36.3  C)   Resp 20   Ht 1.854 m (6' 1\")   Wt 88.5 kg (195 lb)   SpO2 98%   BMI 25.73 kg/m    GENERAL APPEARANCE:  Alert, in no distress, cooperative  RESP:  no respiratory distress  CV: no edema  M/S:   Gait and station abnormal, sitting in wheelchair.  SKIN:  Inspection of skin and subcutaneous tissue baseline, Palpation of skin and subcutaneous tissue baseline.    NEURO:   Cranial nerves 2-12 are normal tested and grossly at patient's baseline, Examination of sensation by touch normal  PSYCH:  Oriented to self; disoriented to place and date/year. , memory impaired , affect and mood normal. Difficult for patient to track conversations.       Recent labs in [facility] reviewed by me today.    Most Recent 3 CBC's:  Recent Labs   Lab Test 01/31/24  1407 01/24/24  1418 01/23/24  1306   WBC 9.5 8.6 11.2*   HGB 11.1* 10.6* 11.2*   * 100 99    152 161     Most Recent 3 BMP's:  Recent Labs   Lab Test 01/31/24  1407 01/24/24  1418 01/23/24  1306    136 135   POTASSIUM 4.8 4.1 4.6   CHLORIDE 101 100 102   CO2 25 24 23   BUN 20.0 " 29.0* 25.9*   CR 1.14 1.31* 1.48*   ANIONGAP 11 12 10   CURT 8.9 8.9 8.6   * 202* 225*         Assessment/Plan:    (F03.B0) Moderate dementia without behavioral disturbance, psychotic disturbance, mood disturbance, or anxiety, unspecified dementia type (H)  (primary encounter diagnosis)  (R53.81) Physical deconditioning  (R29.6) Recurrent falls  (F03.B0) Moderate dementia without behavioral disturbance, psychotic disturbance, mood disturbance, or anxiety, unspecified dementia type (H)  (primary encounter diagnosis)  (R26.81) Unsteady gait  Comment: Moderate dementia with physical deconditioning. Primarily wheelchair dependent. Requires ambulates 15ft w/wheelchair follow with CGA, min-mod A with transfers, supervision to min A with ADLs.   Plan:   - DME ordered: wheelchair [id number], hospital bed via [facility]  - Monitor changes in mood or behaviors  - Continue supportive services at [facility] memory care unit  - Reviewed patient status and treatment plan with Tammy (daughter).        Electronically signed by: EDIE Sibley CNP

## 2024-02-07 NOTE — LETTER
"    2/7/2024        RE: Nick Cameron  [address]  [facility] [place], [place] [address]        [facility] GERIATRICS    Chief Complaint   Patient presents with     Clinic Care Coordination - Face To Face     Face to face for wheelchair, hospital bed     HPI:  Nick Cameron is a 89 year old  (7/30/1933), who is being seen today for an episodic care visit at: [facility] ([facility]) [[id number]].       Today's concern is:     Patient seen today for F2F visit for DME evaluation. Home care OT recommending wheelchair and hospital bed.     During exam, patient seen sitting in wheelchair in common area of memory care unit. HPI limited due to dementia. Denies pain or discomfort today. Admits to good appetite. Denies chest pain, SOB, headache, syncope.      Allergies, and PMH/PSH reviewed in [facility] today.    REVIEW OF SYSTEMS:  Limited secondary to cognitive impairment but today pt reports no concerns      Objective:   /66   Pulse 85   Temp 97.3  F (36.3  C)   Resp 20   Ht 1.854 m (6' 1\")   Wt 88.5 kg (195 lb)   SpO2 98%   BMI 25.73 kg/m    GENERAL APPEARANCE:  Alert, in no distress, cooperative  RESP:  no respiratory distress  CV: no edema  M/S:   Gait and station abnormal, sitting in wheelchair.  SKIN:  Inspection of skin and subcutaneous tissue baseline, Palpation of skin and subcutaneous tissue baseline.    NEURO:   Cranial nerves 2-12 are normal tested and grossly at patient's baseline, Examination of sensation by touch normal  PSYCH:  Oriented to self; disoriented to place and date/year. , memory impaired , affect and mood normal. Difficult for patient to track conversations.       Recent labs in [facility] reviewed by me today.    Most Recent 3 CBC's:  Recent Labs   Lab Test 01/31/24  1407 01/24/24  1418 01/23/24  1306   WBC 9.5 8.6 11.2*   HGB 11.1* 10.6* 11.2*   * 100 99    152 161     Most Recent 3 BMP's:  Recent Labs   Lab Test 01/31/24  1407 01/24/24  1418 01/23/24  1306    " 136 135   POTASSIUM 4.8 4.1 4.6   CHLORIDE 101 100 102   CO2 25 24 23   BUN 20.0 29.0* 25.9*   CR 1.14 1.31* 1.48*   ANIONGAP 11 12 10   CURT 8.9 8.9 8.6   * 202* 225*         Assessment/Plan:    (F03.B0) Moderate dementia without behavioral disturbance, psychotic disturbance, mood disturbance, or anxiety, unspecified dementia type (H)  (primary encounter diagnosis)  (R53.81) Physical deconditioning  (R29.6) Recurrent falls  (F03.B0) Moderate dementia without behavioral disturbance, psychotic disturbance, mood disturbance, or anxiety, unspecified dementia type (H)  (primary encounter diagnosis)  (R26.81) Unsteady gait  Comment: Moderate dementia with physical deconditioning. Primarily wheelchair dependent. Requires ambulates 15ft w/wheelchair follow with CGA, min-mod A with transfers, supervision to min A with ADLs.   Plan:   - DME ordered: wheelchair [id number], hospital bed via [place]  - Monitor changes in mood or behaviors  - Continue supportive services at [facility] memory care unit  - Reviewed patient status and treatment plan with Tammy (daughter).        Electronically signed by: EDIE Sibley CNP           Sincerely,        EDIE Sibley CNP

## 2024-02-23 NOTE — PROGRESS NOTES
[facility] Geriatrics   2024     Name: Nick YUNIER Cameron   : 1933     Background:      Reviewed below changes w/Tammy (daughter)       Orders:  Change metformin 500mg ER, take 1 tablet BID dx diabetes        Electronically signed by  EDIE Sibley CNP on 2024 at 12:36 PM

## 2024-02-24 PROBLEM — N39.0 UTI (URINARY TRACT INFECTION): Status: ACTIVE | Noted: 2024-01-01

## 2024-02-24 PROBLEM — R65.20 SEVERE SEPSIS (H): Status: ACTIVE | Noted: 2024-01-01

## 2024-02-24 PROBLEM — A41.9 SEVERE SEPSIS (H): Status: ACTIVE | Noted: 2024-01-01

## 2024-02-24 NOTE — ED NOTES
Bed: [facility]  Expected date:   Expected time:   Means of arrival:   Comments:  Andrea 523- 89y, M, increased weakness, ? fever

## 2024-02-24 NOTE — ED TRIAGE NOTES
Pt arrives from [facility] for weakness. Per EMS pt is usually able to ambulate independently but today he was required 2 people to assist him. Per EMS he has also been lethargic today. Patient denies pain. Afebrile. ABCs intact.     /57   Pulse 99   Temp 98.9  F (37.2  C) (Oral)   Resp 20   SpO2 95%        Triage Assessment (Adult)       Row Name 02/24/24 1321          Triage Assessment    Airway WDL WDL        Respiratory WDL    Respiratory WDL WDL        Cardiac WDL    Cardiac WDL WDL        Cognitive/Neuro/Behavioral WDL    Cognitive/Neuro/Behavioral WDL WDL

## 2024-02-24 NOTE — PHARMACY-ADMISSION MEDICATION HISTORY
Pharmacist Admission Medication History    Admission medication history is complete. The information provided in this note is only as accurate as the sources available at the time of the update.    Information Source(s): Facility (U/NH/) medication list/MAR via  [facility] Care [facility]    Pertinent Information: none    Changes made to PTA medication list:  Added: None  Deleted: None  Changed: None    Allergies reviewed with patient and updates made in EHR: no  Medication History Completed By: Theo Cruz RPH 2/24/2024 3:28 PM    Prior to Admission medications    Medication Sig Last Dose Taking? Auth Provider Long Term End Date   acetaminophen (MAPAP) 500 MG CAPS Take 2 capsules by mouth every 6 hours as needed Past Month Yes Reported, Patient     aspirin (ASPIRIN 81) 81 MG chewable tablet Take 81 mg by mouth daily 2/24/2024 at am Yes Reported, Patient     atorvastatin (LIPITOR) 20 MG tablet Take 20 mg by mouth daily 2/23/2024 at hs Yes Reported, Patient Yes    betamethasone dipropionate (DIPROSONE) 0.05 % external ointment Apply thin layer to scalp wound BID x 1 week, then 1 week off. Repeat x 6 weeks. Past Month Yes Luann Rogers APRN CNP     diltiazem ER (DILT-XR) 120 MG 24 hr capsule Take 120 mg by mouth daily 2/24/2024 at am Yes Reported, Patient Yes    Emollient (CERAVE) CREA Externally apply topically 2 times daily Past Week Yes Reported, Patient     fluocinonide (LIDEX) 0.05 % external cream Apply topically 2 times daily as needed (rash) Past Month Yes Reported, Patient     ibuprofen (ADVIL/MOTRIN) 400 MG tablet Take 400 mg by mouth every 6 hours as needed for moderate pain Past Month Yes Reported, Patient     ketoconazole (NIZORAL) 2 % external shampoo Apply topically three times a week Past Month Yes Reported, Patient     metFORMIN (GLUCOPHAGE XR) 500 MG 24 hr tablet Take 1 tablet (500 mg) by mouth 2 times daily 2/24/2024 at x 1 Yes Luann Rogers APRN CNP  Yes    niacinamide 500 MG tablet Take 500 mg by mouth 2 times daily 2/24/2024 at x 1 Yes Reported, Patient     SAFE-T-PRO LANCETS MISC 1 Application twice a week   Luann Rogers APRN CNP

## 2024-02-24 NOTE — H&P
[facility]  Internal Medicine  History and Physical      Patient Name: Nick Cameron MRN# [id number]   Age: 89 year old YOB: 1933     Date of Admission:2/24/2024    Primary care provider: Luann Rogers  Date of Service: 2/24/2024         Assessment and Plan:   Nick Cameron is a 89 year old male with a history of Dementia, SCC of the scalp, DM Type 2, HTN, HLD, BPH who presents from his care facility with increased weakness, lethargy and possible fever.    Sepsis due to UTI  Temp 99.8, tachycardic 99, wbc 16.2, lactic acid 2.8.  Abnormal UA.  RSV/Influenza/Covid negative.  CXR negative.  - start IV Ceftriaxone  - follow up urine and blood cultures  - IVF hydration    HTN  - hold pta Diltiazem for now in the setting of sepsis    HLD  - continue ASA, Atorvastatin, Nicacinamide    DM Type 2   Hgb A1C 7.7 (12/2023).  BS on admission 210  - hold Metformin for now  - start ISS protocol    Dementia  Recurrent Falls  Unsteady Gait  Per chart review, at baseline he primarily answer questions with 1-2 word responses.  Able to stand and pivot for transfers.  Ambulates short distances w/walker. Primarily wheelchair bound  Has declined Neurology referral in the past.  Resides in [facility] unit    Chronic Anemia  Hgb 10.6 at baseline    CODE: DNR/DNI  Diet/IVF: regular, NS  DVT ppx: scd    POLST reviewed on admission and states comfort-focused treatment.  I discussed this with the ED provider who then called the family.  Family stated they want IVF and antibiotics and hospital treatment as indicated and to proceed with admission.    - a new POLST will need to be filled out prior to discharge to reflect these wishes.    Donna Dhaliwal MS PA-C  Physician Assistant   Hospitalist Service           Chief Complaint:   Weakness         HPI:   89 year old male with a history of Dementia, SCC of the scalp, DM Type 2, HTN, HLD, BPH who presents from his care facility with increased  "weakness and fever.    History obtained from chart review and discussion with the ED provider.  Patient has baseline dementia.  He is alert and fidgeting around in bed with his bed sheets.  He tells me his name is \"Bhavik\".  He denies any pain.  Unable to complete a full ROS         Past Medical History:     Past Medical History:   Diagnosis Date    BPH (benign prostatic hyperplasia)     DM2 (diabetes mellitus, type 2) (H)     Gout     HLD (hyperlipidemia)     HTN (hypertension)     Malignant neoplasm of skin     [place]          Past Surgical History:     Past Surgical History:   Procedure Laterality Date    CATARACT EXTRACTION W/ INTRAOCULAR LENS IMPLANT      MOHS MICROGRAPHIC PROCEDURE      REPAIR MOHS            Social History:     Social History     Socioeconomic History    Marital status:      Spouse name: Not on file    Number of children: Not on file    Years of education: Not on file    Highest education level: Not on file   Occupational History    Not on file   Tobacco Use    Smoking status: Former     Types: Pipe, Cigars    Smokeless tobacco: Never    Tobacco comments:     Quit 1970 to 1980   Substance and Sexual Activity    Alcohol use: Yes     Comment: Limited    Drug use: Not on file    Sexual activity: Not on file   Other Topics Concern    Not on file   Social History Narrative    Not on file     Social Determinants of Health     Financial Resource Strain: Not on file   Food Insecurity: Not on file   Transportation Needs: Not on file   Physical Activity: Not on file   Stress: Not on file   Social Connections: Not on file   Interpersonal Safety: Not on file   Housing Stability: Not on file          Family History:     Family History   Problem Relation Age of Onset    Hypertension Mother     Rheumatic fever Mother     Bone Cancer Father     Hypertension Father           Allergies:    No Known Allergies       Medications:     Prior to Admission medications    Medication Sig Last Dose Taking? Auth " Provider Long Term End Date   acetaminophen (MAPAP) 500 MG CAPS Take 2 capsules by mouth every 6 hours as needed Past Month Yes Reported, Patient     aspirin (ASPIRIN 81) 81 MG chewable tablet Take 81 mg by mouth daily 2/24/2024 at am Yes Reported, Patient     atorvastatin (LIPITOR) 20 MG tablet Take 20 mg by mouth daily 2/23/2024 at hs Yes Reported, Patient Yes    betamethasone dipropionate (DIPROSONE) 0.05 % external ointment Apply thin layer to scalp wound BID x 1 week, then 1 week off. Repeat x 6 weeks. Past Month Yes Luann Rogers APRN CNP     diltiazem ER (DILT-XR) 120 MG 24 hr capsule Take 120 mg by mouth daily 2/24/2024 at am Yes Reported, Patient Yes    Emollient (CERAVE) CREA Externally apply topically 2 times daily Past Week Yes Reported, Patient     fluocinonide (LIDEX) 0.05 % external cream Apply topically 2 times daily as needed (rash) Past Month Yes Reported, Patient     ibuprofen (ADVIL/MOTRIN) 400 MG tablet Take 400 mg by mouth every 6 hours as needed for moderate pain Past Month Yes Reported, Patient     ketoconazole (NIZORAL) 2 % external shampoo Apply topically three times a week Past Month Yes Reported, Patient     metFORMIN (GLUCOPHAGE XR) 500 MG 24 hr tablet Take 1 tablet (500 mg) by mouth 2 times daily 2/24/2024 at x 1 Yes Luann Rogers APRN CNP Yes    niacinamide 500 MG tablet Take 500 mg by mouth 2 times daily 2/24/2024 at x 1 Yes Reported, Patient     SAFE-T-PRO LANCETS MISC 1 Application twice a week   Luann Rogers APRN CNP            Review of Systems:   Unable to complete a full ROS due to the condition of the patient       Physical Exam:   Blood pressure 126/69, pulse 90, temperature 99.8  F (37.7  C), temperature source Oral, resp. rate 20, SpO2 98%.  General: Alert, interactive, NAD, fidgeting around in bed with his sheets  HEENT: AT/NC, scalp with well healed scars.  Chest/Resp: clear to auscultation bilaterally, no crackles or wheezes  Heart/CV:  regular rate and rhythm, no murmur  Abdomen/GI: Soft, nontender, nondistended. +BS.  No rebound or guarding.  Extremities/MSK: No LE edema.  Toes with chronic appearing wounds with eschar.  No drainage and do not look acutely infected.   Skin: Warm and dry  Neuro: Alert and moves all extremities equally.  Not oriented and unable to provide a history.         Labs:   ROUTINE ICU LABS (Last four results)  CMP  Recent Labs   Lab 02/24/24  1343   *   POTASSIUM 4.6   CHLORIDE 99   CO2 22   ANIONGAP 13   *   BUN 21.0   CR 1.02   GFRESTIMATED 70   CURT 8.8   PROTTOTAL 6.7   ALBUMIN 3.1*   BILITOTAL 1.1   ALKPHOS 136   AST 23   ALT 12     CBC  Recent Labs   Lab 02/24/24  1343   WBC 16.2*   RBC 3.17*   HGB 10.6*   HCT 31.0*   MCV 98   MCH 33.4*   MCHC 34.2   RDW 15.1*             Imaging/Procedures:     Results for orders placed or performed during the hospital encounter of 02/24/24   Chest XR,  PA & LAT    Narrative    EXAM: XR CHEST 2 VIEWS  LOCATION: [facility]  DATE: 02/24/2024    INDICATION: Generalized weakness, leukocytosis.  COMPARISON: 01/23/2024.      Impression    IMPRESSION: Low lung volumes. Mild atelectasis or scarring at the left lung base is similar to previous. Lungs are otherwise clear. No definite pneumonia.

## 2024-02-24 NOTE — ED PROVIDER NOTES
"    History     Chief Complaint:  Generalized Weakness       HPI   Nick Cameron is a 89 year old male with a history of dementia, DM, BPH, hypertension among others who presents for evaluation of increased weakness.  The patient is unable to provide much of any history but denies any pain or concerns.  Per facility staff patient noted to be weaker than usual and having trouble transferring.  Also noted to feel warm and had low-grade fever of around 100 there.  No reported vomiting cough rashes/sores, falls, vomiting or diarrhea.      Independent Historian:    EMS - They report above  I spoke to daughter on the phone who reports that the patient would want selective cares but no \"heroic measures\" and wants to be DNR/DNI but they do want antibiotic treatment if indicated for infection  As well as hospitalization if felt to be appropriate for treatment of infection.  Review of External Notes:  I reviewed [facility] geriatrics note from 2/7/2024 note oriented to self not place or year at that time.    Medications:    No current outpatient medications on file.      Past Medical History:    Past Medical History:   Diagnosis Date    BPH (benign prostatic hyperplasia)     DM2 (diabetes mellitus, type 2) (H)     Gout     HLD (hyperlipidemia)     HTN (hypertension)     Malignant neoplasm of skin        Past Surgical History:    Past Surgical History:   Procedure Laterality Date    CATARACT EXTRACTION W/ INTRAOCULAR LENS IMPLANT      MOHS MICROGRAPHIC PROCEDURE      REPAIR MOHS            Physical Exam   Patient Vitals for the past 24 hrs:   BP Temp Temp src Pulse Resp SpO2 Weight   02/24/24 1753 109/47 98.9  F (37.2  C) Oral 86 20 98 % 81.7 kg (180 lb 1.9 oz)   02/24/24 1719 126/81 97.9  F (36.6  C) Oral 98 20 98 % --   02/24/24 1536 -- -- -- -- -- 98 % --   02/24/24 1533 126/69 -- -- 90 -- -- --   02/24/24 1444 -- 99.8  F (37.7  C) Oral -- -- -- --   02/24/24 1321 101/57 98.9  F (37.2  C) Oral 99 20 95 % --        Physical " Exam  General: Awake, confused  Head:  Scalp is NC/AT  Eyes:  Conjunctiva normal, PERRL  ENT:  The external nose and ears are normal.     Oropharynx clear, uvula midline.  Neck:  Normal range of motion without rigidity.  CV:  Regular rate and rhythm    No pathologic murmur, rubs, or gallops.  Resp:  Breath sounds are clear bilaterally    Non-labored, no retractions or accessory muscle use  Abdomen: Abdomen is soft, no distension, no tenderness, no masses, no apparent cva ttp  MS:  No lower extremity edema/swelling. No midline cervical, thoracic, or lumbar tenderness.  Extremities without joint swelling or redness.  Skin:  Warm and dry, No rash or lesions noted.  Neuro:  Oriented to self not to place or time.  Nods head/answers yes no to basic questions.  Moves all extremities normal.  No facial asymmetry.   Psych: Awake. Confused but cooperative.    Emergency Department Course       Imaging:  Chest XR,  PA & LAT   Final Result   IMPRESSION: Low lung volumes. Mild atelectasis or scarring at the left lung base is similar to previous. Lungs are otherwise clear. No definite pneumonia.           Report per radiology    Laboratory:  Labs Ordered and Resulted from Time of ED Arrival to Time of ED Departure   COMPREHENSIVE METABOLIC PANEL - Abnormal       Result Value    Sodium 134 (*)     Potassium 4.6      Carbon Dioxide (CO2) 22      Anion Gap 13      Urea Nitrogen 21.0      Creatinine 1.02      GFR Estimate 70      Calcium 8.8      Chloride 99      Glucose 210 (*)     Alkaline Phosphatase 136      AST 23      ALT 12      Protein Total 6.7      Albumin 3.1 (*)     Bilirubin Total 1.1     CBC WITH PLATELETS AND DIFFERENTIAL - Abnormal    WBC Count 16.2 (*)     RBC Count 3.17 (*)     Hemoglobin 10.6 (*)     Hematocrit 31.0 (*)     MCV 98      MCH 33.4 (*)     MCHC 34.2      RDW 15.1 (*)     Platelet Count 209      % Neutrophils 71      % Lymphocytes 19      % Monocytes 8      % Eosinophils 0      % Basophils 0      %  Immature Granulocytes 2      NRBCs per 100 WBC 0      Absolute Neutrophils 11.5 (*)     Absolute Lymphocytes 3.1      Absolute Monocytes 1.3      Absolute Eosinophils 0.0      Absolute Basophils 0.0      Absolute Immature Granulocytes 0.3      Absolute NRBCs 0.0     ROUTINE UA WITH MICROSCOPIC REFLEX TO CULTURE - Abnormal    Color Urine Yellow      Appearance Urine Slightly Cloudy (*)     Glucose Urine 150 (*)     Bilirubin Urine Negative      Ketones Urine Trace (*)     Specific Gravity Urine 1.026      Blood Urine Negative      pH Urine 6.5      Protein Albumin Urine 50 (*)     Urobilinogen Urine 8.0 (*)     Nitrite Urine Negative      Leukocyte Esterase Urine Large (*)     Bacteria Urine Few (*)     Mucus Urine Present (*)     RBC Urine 6 (*)     WBC Urine 172 (*)     Squamous Epithelials Urine <1     PROCALCITONIN - Abnormal    Procalcitonin 0.60 (*)    LACTIC ACID WHOLE BLOOD - Abnormal    Lactic Acid 2.8 (*)    LACTIC ACID WHOLE BLOOD - Abnormal    Lactic Acid 3.2 (*)    INFLUENZA A/B, RSV, & SARS-COV2 PCR - Normal    Influenza A PCR Negative      Influenza B PCR Negative      RSV PCR Negative      SARS CoV2 PCR Negative     TSH WITH FREE T4 REFLEX - Normal    TSH 0.91     MAGNESIUM - Normal    Magnesium 1.7     GLUCOSE MONITOR NURSING POCT   GLUCOSE MONITOR NURSING POCT   GLUCOSE MONITOR NURSING POCT   BLOOD CULTURE   BLOOD CULTURE   URINE CULTURE      Emergency Department Course & Assessments:    Interventions:  Medications   aspirin (ASA) chewable tablet 81 mg (has no administration in time range)   atorvastatin (LIPITOR) tablet 20 mg (has no administration in time range)   niacinamide tablet 500 mg (has no administration in time range)   lidocaine 1 % 0.1-1 mL (has no administration in time range)   lidocaine (LMX4) cream (has no administration in time range)   sodium chloride (PF) 0.9% PF flush 3 mL (3 mLs Intracatheter $Given 2/24/24 2292)   sodium chloride (PF) 0.9% PF flush 3 mL (has no administration  in time range)   senna-docusate (SENOKOT-S/PERICOLACE) 8.6-50 MG per tablet 1 tablet (has no administration in time range)     Or   senna-docusate (SENOKOT-S/PERICOLACE) 8.6-50 MG per tablet 2 tablet (has no administration in time range)   glucose gel 15-30 g (has no administration in time range)     Or   dextrose 50 % injection 25-50 mL (has no administration in time range)     Or   glucagon injection 1 mg (has no administration in time range)   sodium chloride 0.9 % infusion ( Intravenous $New Bag 2/24/24 1725)   acetaminophen (TYLENOL) tablet 650 mg (has no administration in time range)   ondansetron (ZOFRAN ODT) ODT tab 4 mg (has no administration in time range)     Or   ondansetron (ZOFRAN) injection 4 mg (has no administration in time range)   insulin aspart (NovoLOG) injection (RAPID ACTING) (has no administration in time range)   insulin aspart (NovoLOG) injection (RAPID ACTING) (has no administration in time range)   cefTRIAXone (ROCEPHIN) 2 g vial to attach to  ml bag for ADULTS or NS 50 ml bag for PEDS (has no administration in time range)   sodium chloride 0.9% BOLUS 1,000 mL (0 mLs Intravenous Stopped 2/24/24 1531)   piperacillin-tazobactam (ZOSYN) 4.5 g vial to attach to  mL bag (0 g Intravenous Stopped 2/24/24 1613)        Assessments:  As above    Independent Interpretation (X-rays, CTs, rhythm strip):  Independently reviewed chest xray, mild cardiomegaly, no obvious infiltrate.    Consultations/Discussion of Management or Tests:  I spoke to Donna COCHRAN who agrees to accept for Dr. Tong       Social Determinants of Health affecting care:  Transportation     Disposition:  The patient was admitted to the hospital under the care of Dr. Tong.     Impression & Plan    CMS Diagnoses: The patient has signs of Severe Sepsis        If one the following conditions is present, a 30 mL/kg bolus is recommended as part of the 6 hour bundle (IBW can be used for BMI >30, or document  "refusal/contraindication):      1.   Initial hypotension  defined as 2 bps < 90 or map < 65 in the 6hrs before or 3hrs after time zero.     2.  Lactate >4.      The patient has signs of Severe Sepsis as evidenced by:    1. 2 SIRS criteria, AND  2. Suspected infection, AND   3. Organ dysfunction: Lactic Acidosis with value >2.0    Time severe sepsis diagnosis confirmed: 1445  24 as this was the time when UA returned positive    3 Hour Severe Sepsis Bundle Completion:    1. Initial Lactic Acid Result:   Recent Labs   Lab Test 24  1727 24  1425   LACT 3.2* 2.8*     2. Blood Cultures before Antibiotics: Yes  3. Broad Spectrum Antibiotics Administered:  yes       Anti-infectives (From admission through now)      Start     Dose/Rate Route Frequency Ordered Stop    24 1800  cefTRIAXone (ROCEPHIN) 2 g vial to attach to  ml bag for ADULTS or NS 50 ml bag for PEDS         2 g  over 30 Minutes Intravenous EVERY 24 HOURS 24 1652      24 1505  piperacillin-tazobactam (ZOSYN) 4.5 g vial to attach to  mL bag        Note to Pharmacy: For [facility], [facility] and [facility]: For Zosyn-naive patients, use the \"Zosyn initial dose + extended infusion\" order panel.    4.5 g  over 30 Minutes Intravenous ONCE 24 1502 24 1613            4. Is initial hypotension present?     No (IV fluid bolus NOT required). IV Fluid volume administered: 1000                    Severe Sepsis reassessment:  1. Repeat Lactic Acid Level within 6 hours of time zero: 3.2  2. MAP>65 after initial IVF bolus, will continue to monitor fluid status and vital signs       Medical Decision Makin-year-old male with history of dementia who presents for increased weakness difficulty transferring low-grade fever.  Workup here consistent with severe sepsis likely secondary to UTI.  No pain or convincing evidence of obstruction.  Vitally stable nontoxic.  Chest x-ray clear.  No evidence of other serious bacterial infection such " as CNS infection, intra-abdominal catastrophe, skin or soft tissue infection etc.  No recent cultures available given Zosyn and fluids.  Repeat lactic actually a little bit higher but remains vitally stable.  Will defer further fluids to the hospitalist team.  The patient's POLST appeared to initially mention that patient would want comfort cares only however in discussion with family daughter Radha Dawson they do not want heroic measures want him to be DNR/DNI but would want antibiotics and hospitalization if felt appropriate.    Critical Care time:  was 0 minutes for this patient excluding procedures.    Diagnosis:    ICD-10-CM    1. UTI (urinary tract infection)  N39.0       2. Severe sepsis (H)  A41.9     R65.20            Discharge Medications:  Current Discharge Medication List             2/24/2024   Marques Meredith, *              Marques Meredith, PA-C  02/24/24 1810

## 2024-02-24 NOTE — ED NOTES
RECEIVING UNIT ED HANDOFF REVIEW    Above ED Nurse Handoff Report was reviewed: Yes  Reviewed by: Darby Gant RN on February 24, 2024 at 5:27 PM   CHILO Lema called the ED to inform them the note was read: Yes   [facility]  ED Nurse Handoff Report    ED Chief complaint: Generalized Weakness  . ED Diagnosis:   Final diagnoses:   None       Allergies: No Known Allergies    Code Status: DNR / DNI    Activity level - Baseline/Home:   unknown, wearing diaper on arrival, assuming total care vs A2 .  Activity Level - Current:    total care .   Lift room needed: No.   Bariatric: No   Needed: No   Isolation: Yes.   Infection: Not Applicable  COVID r/o and special precautions.     Respiratory status: Room air    Vital Signs (within 30 minutes):   Vitals:    02/24/24 1321 02/24/24 1444   BP: 101/57    Pulse: 99    Resp: 20    Temp: 98.9  F (37.2  C) 99.8  F (37.7  C)   TempSrc: Oral Oral   SpO2: 95%        Cardiac Rhythm:  ,      Pain level:    Patient confused: Yes.   Patient Falls Risk: bed/chair alarm on, nonskid shoes/slippers when out of bed, and patient and family education.   Elimination Status:  using primofit      Patient Report - Initial Complaint: generalized weakness.   Focused Assessment: Pt arrives from [facility] for weakness. Per EMS pt is usually able to ambulate independently but today he was required 2 people to assist him. Per EMS he has also been lethargic today. Patient denies pain. Afebrile. ABCs intact.  pt found to have an elevated lactate, blood cultures sent to lab, IVF started     Abnormal Results:   Labs Ordered and Resulted from Time of ED Arrival to Time of ED Departure   COMPREHENSIVE METABOLIC PANEL - Abnormal       Result Value    Sodium 134 (*)     Potassium 4.6      Carbon Dioxide (CO2) 22      Anion Gap 13      Urea Nitrogen 21.0      Creatinine 1.02      GFR Estimate 70      Calcium 8.8      Chloride 99      Glucose 210 (*)      Alkaline Phosphatase 136      AST 23      ALT 12      Protein Total 6.7      Albumin 3.1 (*)     Bilirubin Total 1.1     CBC WITH PLATELETS AND DIFFERENTIAL - Abnormal    WBC Count 16.2 (*)     RBC Count 3.17 (*)     Hemoglobin 10.6 (*)     Hematocrit 31.0 (*)     MCV 98      MCH 33.4 (*)     MCHC 34.2      RDW 15.1 (*)     Platelet Count 209      % Neutrophils 71      % Lymphocytes 19      % Monocytes 8      % Eosinophils 0      % Basophils 0      % Immature Granulocytes 2      NRBCs per 100 WBC 0      Absolute Neutrophils 11.5 (*)     Absolute Lymphocytes 3.1      Absolute Monocytes 1.3      Absolute Eosinophils 0.0      Absolute Basophils 0.0      Absolute Immature Granulocytes 0.3      Absolute NRBCs 0.0     PROCALCITONIN - Abnormal    Procalcitonin 0.60 (*)    LACTIC ACID WHOLE BLOOD - Abnormal    Lactic Acid 2.8 (*)    TSH WITH FREE T4 REFLEX - Normal    TSH 0.91     ROUTINE UA WITH MICROSCOPIC REFLEX TO CULTURE   INFLUENZA A/B, RSV, & SARS-COV2 PCR   BLOOD CULTURE   BLOOD CULTURE        Chest XR,  PA & LAT    (Results Pending)       Treatments provided: labs, imaging, meds  Family Comments: no family with the pt at this time  OBS brochure/video discussed/provided to patient:  No  ED Medications:   Medications   sodium chloride 0.9% BOLUS 1,000 mL (1,000 mLs Intravenous $New Bag 2/24/24 1432)       Drips infusing:  No  For the majority of the shift this patient was Green.   Interventions performed were n/a.    Sepsis treatment initiated: Yes    Per the ED Provider, Time Zero for severe sepsis or septic shock is:  1411    3 Hour Severe Sepsis Bundle Completion:  1. Initial Lactic Acid Result:   Recent Labs   Lab Test 02/24/24  1425   LACT 2.8*     2. Blood Cultures before Antibiotics: Yes  3. Broad Spectrum Antibiotics Administered:     Anti-infectives (From now, onward)      None          4. 1000 ml of IV fluids have been given so far      6 Hour Severe Sepsis Bundle Completion:    1. Repeat Lactic Acid Level:  Last result   Lab Results   Component Value Date    LACT 2.8 (H) 02/24/2024     2. Patient currently on Vasopressors =  No    Cares/treatment/interventions/medications to be completed following ED care: see hospitalist notes, no antibiotics have been ordered as of this note, also, still waiting for UA    ED Nurse Name: Em Eaton RN  2:50 PM

## 2024-02-25 NOTE — PLAN OF CARE
For vital signs and complete assessments, please see documentation flowsheets.     Pertinent assessments: Pt alert, oriented to self only, cooperative with cares, inc urine, groin and scrotum slightly reddened, barrier cream applied, noted scattered scabs and bruising on arms and legs. Ate 50% of meal, set up assistance and fed self.     Major Shift Events None    Treatment Plan: Rocephin, bed alarm on for safety.     Bedside Nurse: Lexie Daily RN

## 2024-02-25 NOTE — PLAN OF CARE
Goal Outcome Evaluation:                    To Do:  End of Shift Summary  For vital signs and complete assessments, please see documentation flowsheets.     Pertinent assessments: Alert to self ---  inc of bowel and bladder---taking small bites in ---   Major Shift Events resting comfortable  Treatment Plan: therapies to see  Bedside Nurse: Darby Gant RN

## 2024-02-25 NOTE — CONSULTS
Care Management Initial Consult    General Information  Assessment completed with: Family, Radha- daughter  Type of CM/SW Visit: Initial Assessment    Primary Care Provider verified and updated as needed:     Readmission within the last 30 days:        Reason for Consult: discharge planning  Advance Care Planning:       Has POLST  Son is POA for finances  Radha does not think pt has a HCD       Communication Assessment  Patient's communication style: spoken language (English or Bilingual)    Hearing Difficulty or Deaf: no   Wear Glasses or Blind: yes    Cognitive  Cognitive/Neuro/Behavioral: (P) .WDL except  Level of Consciousness: (P) somnolent  Arousal Level: (P) arouses to voice  Orientation: (P) disoriented to, place, time, situation     Best Language: (P) 0 - No aphasia  Speech: (P) clear    Living Environment:   People in home: facility resident     Current living Arrangements: assisted living  [facility]   Able to return to prior arrangements: yes       Family/Social Support:  Care provided by:    Provides care for: no one, unable/limited ability to care for self  Marital Status:   Children, Facility resident(s)/Staff, Wife  [place]       Description of Support System: Supportive, Involved    Support Assessment: Adequate family and caregiver support, Adequate social supports    Current Resources:   Patient receiving home care services: No     Community Resources: None  Equipment currently used at home: hospital bed (Halo rails,). W/C   Supplies currently used at home:      Employment/Financial:  Employment Status: retired        Financial Concerns: none   Referral to Financial Worker: No       Does the patient's insurance plan have a 3 day qualifying hospital stay waiver?  No    Lifestyle & Psychosocial Needs:  Social Determinants of Health     Food Insecurity: Not on file   Depression: Not on file   Housing Stability: Not on file   Tobacco Use: Medium Risk  (2/23/2024)    Patient History     Smoking Tobacco Use: Former     Smokeless Tobacco Use: Never     Passive Exposure: Not on file   Financial Resource Strain: Not on file   Alcohol Use: Not on file   Transportation Needs: Not on file   Physical Activity: Not on file   Interpersonal Safety: Not on file   Stress: Not on file   Social Connections: Not on file       Functional Status:  Prior to admission patient needed assistance:   Dependent ADLs:: Bathing, Dressing, Grooming, Positioning, Transfers, Wheelchair-with assist, Toileting  Dependent IADLs:: Cleaning, Cooking, Laundry, Shopping, Meal Preparation, Medication Management, Money Management, Transportation  Assesssment of Functional Status: Needs assistance with handling finances, Needs assistance with laundry/houskeeping, Needs assistance with meals, Needs assistance with dressing, Needs assistance with bathing, Needs assistance with medications, Needs assistance with shopping, Needs assistive devices after discharge, Needs assistance with transportation    Mental Health Status:  Mental Health Status: No Current Concerns       Chemical Dependency Status:  Chemical Dependency Status: No Current Concerns             Values/Beliefs:  Spiritual, Cultural Beliefs, Orthodoxy Practices, Values that affect care:                 Additional Information:  Spoke with daughter Radha on the phone. She reported that her mom/dad live together in the [place] at [facility]. Pt's wife Rosalia graduated off Hospice  ( [facility] she has Parkinson's). At baseline pt is W/C bound and able to pivot with staff. He is not impulsive but has had several falls/slips out of bed. Just this past week family have obtained a hospital bed with halo bars and the bed has a foot board to help prevent the falls.     Radha did ask SW about possible consideration for Hospice support for her dad given his age and the frequency of the UTI's. Family have notices in the last two weeks that  Nick has lost some of his verbal skills and tends to use gestures instead. Nick  is still able to feed himself but requires assist for all other ADl's.  Radha  one concern about possible hospice support. 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      SW suggested  Radha  speak with [facility] and inquire if they would be able to admit under a dementia diagnosis  and if that would allow the continuation of the Mohs surgery. Family feel that this surgery is for comfort and pain control.    Radha does identifies herself as the medical health care decision maker and her brother as the financial POA. Radha feels that there may be some resistance from  other family mbrs to consider Hospice but also stated they had a good experience with her mom on Hospice too.    Should the plan be to return to [facility] in the next one to two day, Radha has requested a W/C transport and aware this will be an OP cost. Pt is a  but not eligible for any [facility] through [facility] and not open to outside home care support.     Care mgt team will continue to follow. Will need to have conversation with [facility] staff prior to his return.     Corinne C. White, [facility]  Care mgt team  [phone]

## 2024-02-25 NOTE — PROGRESS NOTES
[facility]    Medicine Progress Note - Hospitalist Service    Date of Admission:  2/24/2024    Assessment & Plan   Nick Cameron is a 89 year old male came to attention from his care facility on 2/24/2024 due to concern for increased weakness, lethargy and possible fever.    PMH is notable for dementia, SCC of the scalp, DM Type 2, HTN, HLD, BPH.    On presentation to emergency department, VS: /57, HR 99, resp unlabored with O2 sat 99% in RA.  T max 99.8.  Labs: WBC 16.2, Hgb 10.6, plt 209.  Creat 1.02, Na 134, otherwise normal electrolytes. Glucose 210. Lactic acid 2.1, pro-calcitonin 0.6.  UA strongly suggestive of UTI.  Blood and urine cultures obtained.     DX:  1.  Encephalopathy, thought to be due to infection. Although UTI was suspected, UC is significant for only mixed  flra. Blood cx NTD.  WBC improving on Ceftriaxone. SIRS v sepsis?  2.  HTN.   3.  NIDDM with hyperglycemia.   4.  Dementia, recurrent falls.    5.  Goals of care include medical support for reversible causes, per admitting provider.     PLAN:  1.  Ceftriaxone is empiric coverage for UTI. COnt for now. If blood cx also are negative, will stop after 3 days.   2.  Need clear goals of care discussion with primary decision-maker.          Diet: Combination Diet Regular Diet Adult    DVT Prophylaxis: Pneumatic Compression Devices  Tobias Catheter: Not present  Lines: None     Cardiac Monitoring: None  Code Status: No CPR- Do NOT Intubate      Clinically Significant Risk Factors Present on Admission              # Hypoalbuminemia: Lowest albumin = 3.1 g/dL at 2/24/2024  1:43 PM, will monitor as appropriate   # Drug Induced Platelet Defect: home medication list includes an antiplatelet medication   # Hypertension: Noted on problem list     # DMII: A1C = 7.7 % (Ref range: <5.7 %) within past 6 months               Disposition Plan      Expected Discharge Date: 02/27/2024                    Gavin Gongora MD  Hospitalist  "Service  [facility]  Securely message with [employer] (more info)  Text page via [employer] Paging/Directory   ______________________________________________________________________    Interval History   Chart reviewed, pt interviewed.   Assumed care today. Pt did not waken to voice for me or with my exam.     I spoke with the bedside nurse who indicated that the pt had answered \"yes\" and \"no\" questions. He did eat some, but not much.     Physical Exam   Vital Signs: Temp: 98.6  F (37  C) Temp src: Oral BP: 128/57 Pulse: 89   Resp: 20 SpO2: 97 % O2 Device: None (Room air)    Weight: 180 lbs 1.85 oz    General Appearance: Sleeping elderly male in NAD.  Not responsive to voice or gentle attempts to waken him.  Respiratory: no increased WOB. CTA with pt sying on his right side.   Cardiovascular: RRR without murmur  GI: soft, NTND  Skin: no obvious lesions.  Other:      Medical Decision Making       35 MINUTES SPENT BY ME on the date of service doing chart review, history, exam, documentation & further activities per the note.      Data   Results for orders placed or performed during the hospital encounter of 02/24/24 (from the past 24 hour(s))   Glucose by meter   Result Value Ref Range    GLUCOSE BY METER POCT 291 (H) 70 - 99 mg/dL   Lactic acid whole blood   Result Value Ref Range    Lactic Acid 2.1 (H) 0.7 - 2.0 mmol/L   Glucose by meter   Result Value Ref Range    GLUCOSE BY METER POCT 184 (H) 70 - 99 mg/dL   Basic metabolic panel   Result Value Ref Range    Sodium 136 135 - 145 mmol/L    Potassium 4.1 3.4 - 5.3 mmol/L    Chloride 104 98 - 107 mmol/L    Carbon Dioxide (CO2) 24 22 - 29 mmol/L    Anion Gap 8 7 - 15 mmol/L    Urea Nitrogen 20.0 8.0 - 23.0 mg/dL    Creatinine 1.01 0.67 - 1.17 mg/dL    GFR Estimate 71 >60 mL/min/1.73m2    Calcium 8.4 8.2 - 9.6 mg/dL    Glucose 179 (H) 70 - 99 mg/dL   CBC with platelets   Result Value Ref Range    WBC Count 12.8 (H) 4.0 - 11.0 10e3/uL    RBC Count 3.06 (L) " 4.40 - 5.90 10e6/uL    Hemoglobin 10.3 (L) 13.3 - 17.7 g/dL    Hematocrit 30.3 (L) 40.0 - 53.0 %    MCV 99 78 - 100 fL    MCH 33.7 (H) 26.5 - 33.0 pg    MCHC 34.0 31.5 - 36.5 g/dL    RDW 15.6 (H) 10.0 - 15.0 %    Platelet Count 169 150 - 450 10e3/uL   Glucose by meter   Result Value Ref Range    GLUCOSE BY METER POCT 200 (H) 70 - 99 mg/dL   Glucose by meter   Result Value Ref Range    GLUCOSE BY METER POCT 150 (H) 70 - 99 mg/dL

## 2024-02-26 NOTE — PROGRESS NOTES
Care Management Follow Up    Length of Stay (days): 2    Expected Discharge Date: 02/28/2024     Concerns to be Addressed: discharge planning     Patient plan of care discussed at interdisciplinary rounds: Yes    Anticipated Discharge Disposition: Assisted Living     Anticipated Discharge Services: None  Anticipated Discharge DME: None    Patient/family educated on Medicare website which has current facility and service quality ratings: no  Education Provided on the Discharge Plan: No  Patient/Family in Agreement with the Plan:      Referrals Placed by CM/SW:    Private pay costs discussed: Not applicable    Additional Information:  SW spoke with [facility]. They are wanting an updated weight. It is unknown if patient would qualify for hospice so [facility] would like to come to the hospital to lay eyes on him.     [facility] also noted it would be up to the insurance to see if he would qualify for MOHs surgery about every 6 months.     SW spoke with family.  SW noted [facility] would only accept patient back if he has a lift. SW explained renting vs buying. Sw note if patient qualifies for hospice, then medicare would pay for equipment. Families main concern is that patient needs MOHs and would not be comfortable enrolling in hospice unless he can continue to get it.     SW will have [facility] call family.     VANITA Damon, LGSW  Emergency Room   Please contact the SW on the floor in which the patient is staying for any questions or concerns

## 2024-02-26 NOTE — PLAN OF CARE
Occupational Therapy: Orders received. Chart reviewed and discussed with care team.? Occupational Therapy not indicated due to patient requiring assist w/ all cares at baseline. Per PT, no OT needs and all inpatient therapy needs being my by PT.? Defer discharge recommendations to PT and care team.? Will complete orders.

## 2024-02-26 NOTE — PROGRESS NOTES
Care Management Follow Up    Length of Stay (days): 2    Expected Discharge Date: 02/27/2024     Concerns to be Addressed: discharge planning     Patient plan of care discussed at interdisciplinary rounds: Yes    Anticipated Discharge Disposition: Assisted Living     Anticipated Discharge Services: None  Anticipated Discharge DME: None    Patient/family educated on Medicare website which has current facility and service quality ratings: no  Education Provided on the Discharge Plan: No  Patient/Family in Agreement with the Plan:      Referrals Placed by CM/SW:    Private pay costs discussed: Not applicable    Additional Information:  DEVON called [employer] [employer] P: [phone] [facility] to verify services, fax, pharmacy, and preferred return time.     Addendum 1108: Spoke with ROSEANNA at "[employer]  P: [phone], fax: [phone]. Patient is an assist x1 and normally  uses a wheelchair. He gets assistance with all cares. They can take patient back as an assist x2 but family would have to purchase a lift. Lamin LAGUNAS for Palliative consult.     Addendum 4620: Spoke with [facility]. They are reviewing to see if patient would qualify and if patient can continue MOHs.       VANITA Damon, LGSW  Emergency Room   Please contact the SW on the floor in which the patient is staying for any questions or concerns

## 2024-02-26 NOTE — PLAN OF CARE
End of Shift Summary  For vital signs and complete assessments, please see documentation flowsheets.     Pertinent assessments: Alert oriented to self only, inc of  bladder, assist of 2 to reposition and change pad. No signs of pain.     Major Shift Events None    Treatment Plan: Therapies to see    Bedside Nurse: Lexie Daily RN

## 2024-02-26 NOTE — PROGRESS NOTES
[facility]    Medicine Progress Note - Hospitalist Service    Date of Admission:  2/24/2024    Assessment & Plan   Nick Cameron is a 89 year old male came to attention from his care facility on 2/24/2024 due to concern for increased weakness, lethargy and possible fever.    PMH is notable for dementia, SCC of the scalp, DM Type 2, HTN, HLD, BPH.    On presentation to emergency department, VS: /57, HR 99, resp unlabored with O2 sat 99% in RA.  T max 99.8.  Labs: WBC 16.2, Hgb 10.6, plt 209.  Creat 1.02, Na 134, otherwise normal electrolytes. Glucose 210. Lactic acid 2.1, pro-calcitonin 0.6.  UA strongly suggestive of UTI.  Blood and urine cultures obtained.     DX:  1.  Encephalopathy, thought to be due to infection. Although UTI was suspected, UC is significant for only mixed  flra. Blood cx NTD.  WBC improving on Ceftriaxone. SIRS v sepsis?  2.  HTN.   3.  NIDDM with hyperglycemia.   4.  Dementia, recurrent falls.    5.  Goals of care include medical support for reversible causes, per admitting provider.     PLAN:  1.  Ceftriaxone is empiric coverage for UTI. COnt for now. If blood cx also are negative, will complete a week course of abx.   2.  Expect discharge  to LTC tomorrow. Hospice meeting in am.           Diet: Combination Diet Regular Diet Adult    DVT Prophylaxis: Pneumatic Compression Devices  Tobias Catheter: Not present  Lines: None     Cardiac Monitoring: None  Code Status: No CPR- Do NOT Intubate      Clinically Significant Risk Factors            # Hypomagnesemia: Lowest Mg = 1.5 mg/dL in last 2 days, will replace as needed   # Hypoalbuminemia: Lowest albumin = 3.1 g/dL at 2/24/2024  1:43 PM, will monitor as appropriate     # Hypertension: Noted on problem list       # DMII: A1C = 7.7 % (Ref range: <5.7 %) within past 6 months, PRESENT ON ADMISSION      # Financial/Environmental Concerns: none         Disposition Plan      Expected Discharge Date: 02/28/2024,  3:00 PM     Destination: home with help/services     barring unexpected changes overnight, pt will discharge home tomorrow.          Gavin Gongora MD  Hospitalist Service  [facility]  Securely message with [employer] (more info)  Text page via [employer] Paging/Directory   ______________________________________________________________________    Interval History   Mr. Cameron was seen today with his adult children at the bedside.  They indicate that he looks somewhat better.  We talked in some detail about the findings from the hospitalization, etc.    We had an extensive discussion and I also spoke with daughter Radha twice regarding the pt's plan.      Physical Exam   Vital Signs: Temp: 98  F (36.7  C) Temp src: Oral BP: 108/62 Pulse: 94   Resp: 20 SpO2: 98 % O2 Device: None (Room air)    Weight: 180 lbs 1.85 oz    General Appearance: Alert and interactive when I went in to see him this morning.  He is very hard of hearing but was eating breakfast on his own.  I subsequently returned when the patient's son and daughter were present and he was less interactive with me at that time but seemed to be deferring to them for questions, etc.  Respiratory: no increased WOB. CTA with pt lying on his right side.   Cardiovascular: RRR without murmur  GI: soft, NTND  Skin: no obvious lesions.  Notably the patient has old Mohs surgical scars over his scalp with some nodularity where he presumably has old scar tissue.  Other:      Medical Decision Making       35 MINUTES SPENT BY ME on the date of service doing chart review, history, exam, documentation & further activities per the note.      Data   Results for orders placed or performed during the hospital encounter of 02/24/24 (from the past 24 hour(s))   Glucose by meter   Result Value Ref Range    GLUCOSE BY METER POCT 150 (H) 70 - 99 mg/dL   Glucose by meter   Result Value Ref Range    GLUCOSE BY METER POCT 193 (H) 70 - 99 mg/dL   Glucose by meter   Result Value Ref  Range    GLUCOSE BY METER POCT 149 (H) 70 - 99 mg/dL   Basic metabolic panel   Result Value Ref Range    Sodium 134 (L) 135 - 145 mmol/L    Potassium 4.1 3.4 - 5.3 mmol/L    Chloride 103 98 - 107 mmol/L    Carbon Dioxide (CO2) 22 22 - 29 mmol/L    Anion Gap 9 7 - 15 mmol/L    Urea Nitrogen 17.1 8.0 - 23.0 mg/dL    Creatinine 0.87 0.67 - 1.17 mg/dL    GFR Estimate 82 >60 mL/min/1.73m2    Calcium 8.2 8.2 - 9.6 mg/dL    Glucose 173 (H) 70 - 99 mg/dL   CBC with platelets   Result Value Ref Range    WBC Count 12.4 (H) 4.0 - 11.0 10e3/uL    RBC Count 3.17 (L) 4.40 - 5.90 10e6/uL    Hemoglobin 10.5 (L) 13.3 - 17.7 g/dL    Hematocrit 31.8 (L) 40.0 - 53.0 %     78 - 100 fL    MCH 33.1 (H) 26.5 - 33.0 pg    MCHC 33.0 31.5 - 36.5 g/dL    RDW 15.2 (H) 10.0 - 15.0 %    Platelet Count 165 150 - 450 10e3/uL   Magnesium   Result Value Ref Range    Magnesium 1.5 (L) 1.7 - 2.3 mg/dL   Lab Blood Morphology Pathologist Review    Narrative    The following orders were created for panel order Lab Blood Morphology Pathologist Review.  Procedure                               Abnormality         Status                     ---------                               -----------         ------                     Bld morphology pathology...[[id number]]                      Final result               CBC with platelets and d...[[id number]]  Abnormal            Final result               Reticulocyte count[[id number]]           Abnormal            Final result               Morphology Tracking[[id number]]                              Final result                 Please view results for these tests on the individual orders.   Lactate Dehydrogenase   Result Value Ref Range    Lactate Dehydrogenase 270 (H) 0 - 250 U/L   Bld morphology pathology review   Result Value Ref Range    Final Diagnosis       Peripheral blood:  --Moderate anemia, normochromic normocytic.  --Slight overall leukocytosis with normal differential counts.      Comment       The  cause of this patient's anemia is unclear from the peripheral smear. There is no morphologic evidence of red cell fragmentation or increased red cell regeneration to suggest either hemolysis or acute blood loss.        Clinical Information       Persistent elevated WBC.      Peripheral Smear       The red blood cells appear normochromic.  Rouleaux formation does not appear increased.  Polychromasia does not appear increased.  Poikilocytosis appears mild and nonspecific.  Platelets  are well granulated.  Lymphocytes are predominantly small with cytologically mature chromatin and appear overall polymorphous.  Neutrophils contain normal cytoplasmic granulation and unremarkable nuclear morphology.  There is no dysplasia and no circulating blasts are seen.        Peripheral Hematologic Data        Latest Reference Range & Units 02/26/24 07:18   WBC 4.0 - 11.0 10e3/uL  4.0 - 11.0 10e3/uL 12.4 (H)  12.4 (H)   Hemoglobin 13.3 - 17.7 g/dL  13.3 - 17.7 g/dL 10.5 (L)  10.5 (L)   Hematocrit 40.0 - 53.0 %  40.0 - 53.0 % 31.8 (L)  31.8 (L)   Platelet Count 150 - 450 10e3/uL  150 - 450 10e3/uL 165  165   RBC Count 4.40 - 5.90 10e6/uL  4.40 - 5.90 10e6/uL 3.17 (L)  3.17 (L)   MCV 78 - 100 fL  78 - 100 fL 100  100   MCH 26.5 - 33.0 pg  26.5 - 33.0 pg 33.1 (H)  33.1 (H)   MCHC 31.5 - 36.5 g/dL  31.5 - 36.5 g/dL 33.0  33.0   RDW 10.0 - 15.0 %  10.0 - 15.0 % 15.2 (H)  15.2 (H)   % Neutrophils % 68   % Lymphocytes % 22   % Monocytes % 8   % Eosinophils % 1   % Basophils % 0   Absolute Basophils 0.0 - 0.2 10e3/uL 0.0   Absolute Eosinophils 0.0 - 0.7 10e3/uL 0.1   Absolute Immature Granulocytes <=0.4 10e3/uL 0.2   Absolute Lymphocytes 0.8 - 5.3 10e3/uL 2.6   Absolute Monocytes 0.0 - 1.3 10e3/uL 1.0   % Immature Granulocytes % 1   Absolute Neutrophils 1.6 - 8.3 10e3/uL 8.1   Absolute NRBCs 10e3/uL 0.0   NRBCs per 100 WBC <1 /100 0   % Retic 0.5 - 2.0 % 3.0 (H)   Absolute Retic 0.025 - 0.095 10e6/uL 0.094   (H): Data is abnormally  high  (L): Data is abnormally low      Performing Labs       The technical component of this testing was completed at [facility], [facility] and [facility]     CBC with platelets and differential   Result Value Ref Range    WBC Count 12.4 (H) 4.0 - 11.0 10e3/uL    RBC Count 3.17 (L) 4.40 - 5.90 10e6/uL    Hemoglobin 10.5 (L) 13.3 - 17.7 g/dL    Hematocrit 31.8 (L) 40.0 - 53.0 %     78 - 100 fL    MCH 33.1 (H) 26.5 - 33.0 pg    MCHC 33.0 31.5 - 36.5 g/dL    RDW 15.2 (H) 10.0 - 15.0 %    Platelet Count 165 150 - 450 10e3/uL    % Neutrophils 68 %    % Lymphocytes 22 %    % Monocytes 8 %    % Eosinophils 1 %    % Basophils 0 %    % Immature Granulocytes 1 %    NRBCs per 100 WBC 0 <1 /100    Absolute Neutrophils 8.1 1.6 - 8.3 10e3/uL    Absolute Lymphocytes 2.6 0.8 - 5.3 10e3/uL    Absolute Monocytes 1.0 0.0 - 1.3 10e3/uL    Absolute Eosinophils 0.1 0.0 - 0.7 10e3/uL    Absolute Basophils 0.0 0.0 - 0.2 10e3/uL    Absolute Immature Granulocytes 0.2 <=0.4 10e3/uL    Absolute NRBCs 0.0 10e3/uL   Reticulocyte count   Result Value Ref Range    % Reticulocyte 3.0 (H) 0.5 - 2.0 %    Absolute Reticulocyte 0.094 0.025 - 0.095 10e6/uL   Glucose by meter   Result Value Ref Range    GLUCOSE BY METER POCT 164 (H) 70 - 99 mg/dL   Glucose by meter   Result Value Ref Range    GLUCOSE BY METER POCT 149 (H) 70 - 99 mg/dL   Magnesium   Result Value Ref Range    Magnesium 1.7 1.7 - 2.3 mg/dL

## 2024-02-26 NOTE — PROGRESS NOTES
"   02/26/24 1000   Appointment Info   Signing Clinician's Name / Credentials (PT) Erika Keane, PT, DPT   Living Environment   People in Home spouse   Current Living Arrangements assisted living   Home Accessibility no concerns;wheelchair accessible   Transportation Anticipated agency   Living Environment Comments Patient lives with his wife at [facility].   Self-Care   Usual Activity Tolerance fair   Current Activity Tolerance poor   Regular Exercise No   Equipment Currently Used at Home walker, rolling;hospital bed;wheelchair, manual   Fall history within last six months   (\"No\" per patient however family obtained a hospital bed with halo rails to help prevent falls.)   Activity/Exercise/Self-Care Comment Patient does not ambulate at baseline.  With FWW and staff assisting, he was able to perform pivot transfer to wheelchair.  Patient was able to feed himself but otherwise required assist for all ADLs.   General Information   Onset of Illness/Injury or Date of Surgery 02/24/24   Referring Physician Gavin Gongora MD   Patient/Family Therapy Goals Statement (PT) Patient does not state.   Pertinent History of Current Problem (include personal factors and/or comorbidities that impact the POC) 89 year old male came to attention from his care facility on 2/24/2024 due to concern for increased weakness, lethargy and possible fever. PMH is notable for dementia, SCC of the scalp, DM Type 2, HTN, HLD, BPH.   Existing Precautions/Restrictions fall   Cognition   Affect/Mental Status (Cognition) confused;low arousal/lethargic   Orientation Status (Cognition) oriented to;person   Follows Commands (Cognition) follows one-step commands;50-74% accuracy;delayed response/completion;increased processing time needed   Pain Assessment   Patient Currently in Pain No   Integumentary/Edema   Integumentary/Edema Comments Age-related skin changes   Posture    Posture Forward head position;Protracted shoulders   Range of " Motion (ROM)   Range of Motion ROM is WFL   Strength (Manual Muscle Testing)   Strength (Manual Muscle Testing) Deficits observed during functional mobility   Bed Mobility   Comment, (Bed Mobility) ModAx1 needed to assist LE to EOB during supine > sit, UE support on bed rail and HOB elevated.   Transfers   Comment, (Transfers) MaxAx2 sit <> stand, patient repeatedly attempting to pull with B hands on walker.   Gait/Stairs (Locomotion)   Comment, (Gait/Stairs) Unable to ambulate, does not appear that patient ambulates at baseline.   Balance   Balance Comments Impaired dynamic balance, uses FWW at baseline   Sensory Examination   Sensory Perception patient reports no sensory changes   Clinical Impression   Criteria for Skilled Therapeutic Intervention Yes, treatment indicated   PT Diagnosis (PT) Impaired functional mobility   Influenced by the following impairments Lethargy, baseline dementia/confusion, generalized weakness, impaired balance, poor activity tolerance   Functional limitations due to impairments Impaired independence with bed mobility and transfers   Clinical Presentation (PT Evaluation Complexity) evolving   Clinical Presentation Rationale Clinical judgement, PMH, social support   Clinical Decision Making (Complexity) moderate complexity   Planned Therapy Interventions (PT) balance training;bed mobility training;home exercise program;neuromuscular re-education;patient/family education;postural re-education;strengthening;transfer training;wheelchair management/propulsion training;progressive activity/exercise   Risk & Benefits of therapy have been explained evaluation/treatment results reviewed;care plan/treatment goals reviewed;risks/benefits reviewed;participants voiced agreement with care plan;participants included;patient   PT Total Evaluation Time   PT Eval, Moderate Complexity Minutes (14815) 8   Physical Therapy Goals   PT Frequency Daily   PT Predicted Duration/Target Date for Goal Attainment  02/29/24   PT Goals Bed Mobility;Transfers;Wheelchair Mobility   PT: Bed Mobility Supervision/stand-by assist;Supine to/from sit;Rolling   PT: Transfers Minimal assist;Sit to/from stand;Bed to/from chair;Assistive device   PT: Wheelchair Mobility 50 feet;Caregiver SBA;manual wheelchair   Interventions   Interventions Quick Adds Therapeutic Activity   Therapeutic Activity   Therapeutic Activities: dynamic activities to improve functional performance Minutes (86289) 19   Symptoms Noted During/After Treatment Fatigue   Treatment Detail/Skilled Intervention Patient heavily asleep on arrival, difficult to wake.  Window blinds openned to assist with waking patient.  Time needed to set-up room as well as retrieve gait belt and FWW.  Patient waking with increased time.  Patient agreeable to trial transfer from bed to wheelchair.  Patient with baseline dementia, needing repeated cues throughout session.  Patient cued for ankle pumps and SLRs.  Patient minimally able to lift B heels off bed during SLR but hip flexion limited by weakness.  ModAx1 needed for supine > sit.  Once seated at EOB, maxA at sheet to scoot hips forward to position feet on floor.  Sitting balance maintained with SBA x 5 minutes, denies lightheadedness.  Cues for upright sitting posture as patient sitting with forward flexed head and rounded shoulders.  Patient requring multiple attempts and maxAx2 for sit > stand from elevated bed height.  Repeated cues for safe hand placement pushing from bed rather than pulling on walker.  In standing, patient with forward flexed posture and heavy bracing of legs posteriorly against bed.  Cues for upright standing posture with minimal improvement.  Patient completes stand-pivot transfer with modAx2 and FWW.  Fatigues quickly and needing maxA for stand > sit in recliner chair.  Patient attempting to reposition self in chair with B hands on armrest, limited ability to clear hips.  Increased time positioning pillows for  patient comfort.  Call light in reach and chair alarm activated.   PT Discharge Planning   PT Plan Bed mob with hospital bed, repeated sit <> stands, stand-pivot transfers, have patient propel wc?   PT Discharge Recommendation (DC Rec) home with assist;home with home care physical therapy   PT Rationale for DC Rec Patient currently needing Ax2 for stand-pivot transfer from bed to recliner chair secondary to lethargy, weakness, and deconditioning.  Mobility also challenged by confusion with baseline dementia.  Patient resides in memory care with his wife.  He reports he has staff assist with transfers and ADLs, is wheelchair bound at baseline.  Anticipate with continued IP PT, patient will improve strength for greater independence with bed mobility and transfers.  If returning home, patient would benefit from access to lift equipment to facilitate transfers as he required strong assist of two today.   PT to promote return to prior level of function.   PT Brief overview of current status Strong Ax2 stand-pivot to chair with FWW, rec Ax2 lift   PT Equipment Needed at Discharge wheelchair;walker, rolling;hospital bed;gait belt;lift device  (Patient would benefit from a lift to facilitate transfers.)   Total Session Time   Timed Code Treatment Minutes 19   Total Session Time (sum of timed and untimed services) 27

## 2024-02-27 PROBLEM — M62.81 GENERALIZED MUSCLE WEAKNESS: Status: ACTIVE | Noted: 2024-01-01

## 2024-02-27 PROBLEM — R29.6 FALLS FREQUENTLY: Status: ACTIVE | Noted: 2024-01-01

## 2024-02-27 PROBLEM — F02.B0 MODERATE LATE ONSET ALZHEIMER'S DEMENTIA WITHOUT BEHAVIORAL DISTURBANCE, PSYCHOTIC DISTURBANCE, MOOD DISTURBANCE, OR ANXIETY (H): Status: ACTIVE | Noted: 2024-01-01

## 2024-02-27 PROBLEM — G30.1 MODERATE LATE ONSET ALZHEIMER'S DEMENTIA WITHOUT BEHAVIORAL DISTURBANCE, PSYCHOTIC DISTURBANCE, MOOD DISTURBANCE, OR ANXIETY (H): Status: ACTIVE | Noted: 2024-01-01

## 2024-02-27 PROBLEM — D72.825 BANDEMIA: Status: ACTIVE | Noted: 2024-01-01

## 2024-02-27 PROBLEM — D72.825 BANDEMIA: Status: RESOLVED | Noted: 2024-01-01 | Resolved: 2024-01-01

## 2024-02-27 PROBLEM — G93.40 ENCEPHALOPATHY: Status: ACTIVE | Noted: 2024-01-01

## 2024-02-27 PROBLEM — D04.9 SQUAMOUS CELL CARCINOMA IN SITU (SCCIS) OF SKIN: Status: ACTIVE | Noted: 2018-11-06

## 2024-02-27 PROBLEM — A41.9 SEVERE SEPSIS (H): Status: RESOLVED | Noted: 2024-01-01 | Resolved: 2024-01-01

## 2024-02-27 PROBLEM — R63.4 WEIGHT LOSS: Status: ACTIVE | Noted: 2024-01-01

## 2024-02-27 PROBLEM — R65.20 SEVERE SEPSIS (H): Status: RESOLVED | Noted: 2024-01-01 | Resolved: 2024-01-01

## 2024-02-27 PROBLEM — E86.0 DEHYDRATION: Status: ACTIVE | Noted: 2024-01-01

## 2024-02-27 NOTE — PROGRESS NOTES
Care Management Follow Up    Length of Stay (days): 3    Expected Discharge Date: 02/27/2024     Concerns to be Addressed: discharge planning     Patient plan of care discussed at interdisciplinary rounds: Yes    Anticipated Discharge Disposition: Assisted Living     Anticipated Discharge Services: None  Anticipated Discharge DME: None    Patient/family educated on Medicare website which has current facility and service quality ratings: no  Education Provided on the Discharge Plan: No  Patient/Family in Agreement with the Plan:      Referrals Placed by CM/SW:    Private pay costs discussed: transportation costs    Additional Information:  SW spoke with daughter. Patient does not qualify for hospice per [place]. Patient would need an order for a lift. Patient's daughter spoke with the facility and was told they just need an order and do not need to supply the lift.     Spoke with DON at [facility]  P: [phone], fax: [phone]. Verified the facility can accept with a sid lift or ez stand. They would need an order. Facility also faxed  a resumption order that needs to be filled out by MD.     Family is requesting a w/c transport at discharge. Aware of costs     Addendum 1401:  was informed that patient is able to discharge. Ride set up for 2/28/24 from 4313-5144.     VANITA Damon, LGSW  Emergency Room   Please contact the SW on the floor in which the patient is staying for any questions or concerns

## 2024-02-27 NOTE — DISCHARGE SUMMARY
[facility] Discharge Summary    Nick Cameron MRN# [id number]   Age: 89 year old YOB: 1933     Date of Admission:  2/24/2024  Date of Discharge::  2/28/2024  Admitting Physician:  Jeniffer Tong DO  Discharge Physician:  Gavin Gongora MD            Admission Diagnoses:   UTI (urinary tract infection) [N39.0]  Severe sepsis (H) [A41.9, R65.20]          Discharge Diagnosis:     Principal Problem:    Encephalopathy, initially thought to be triggered by infection, but infection was not proven. Possibly due to dehydration, v overall decline.   Active Problems:    Diabetes mellitus, type 2 (H)    Squamous cell carcinoma in situ (SCCIS) of skin    UTI (urinary tract infection)    Moderate late onset Alzheimer's dementia without behavioral disturbance, psychotic disturbance, mood disturbance, or anxiety (H)    Generalized muscle weakness    Weight loss    Falls frequently    Dehydration, assoc mild lactic acidosis on presentation    Leukocytosis of unclear significance. Resolved with empiric antibiotic therapy and supportive cares.            Procedures:   Chest x-ray          Medications Prior to Admission:     Medications Prior to Admission   Medication Sig Dispense Refill Last Dose    acetaminophen (MAPAP) 500 MG CAPS Take 2 capsules by mouth every 6 hours as needed   Past Month    aspirin (ASPIRIN 81) 81 MG chewable tablet Take 81 mg by mouth daily   2/24/2024 at am    atorvastatin (LIPITOR) 20 MG tablet Take 20 mg by mouth daily   2/23/2024 at hs    betamethasone dipropionate (DIPROSONE) 0.05 % external ointment Apply thin layer to scalp wound BID x 1 week, then 1 week off. Repeat x 6 weeks.   Past Month    diltiazem ER (DILT-XR) 120 MG 24 hr capsule Take 120 mg by mouth daily   2/24/2024 at am    Emollient (CERAVE) CREA Externally apply topically 2 times daily   Past Week    fluocinonide (LIDEX) 0.05 % external cream Apply topically 2 times daily as needed (rash)   Past Month     ibuprofen (ADVIL/MOTRIN) 400 MG tablet Take 400 mg by mouth every 6 hours as needed for moderate pain   Past Month    ketoconazole (NIZORAL) 2 % external shampoo Apply topically three times a week   Past Month    metFORMIN (GLUCOPHAGE XR) 500 MG 24 hr tablet Take 1 tablet (500 mg) by mouth 2 times daily 60 tablet 11 2/24/2024 at x 1    niacinamide 500 MG tablet Take 500 mg by mouth 2 times daily   2/24/2024 at x 1    SAFE-T-PRO LANCETS MISC 1 Application twice a week 200 each 0              Discharge Medications:     Current Discharge Medication List        START taking these medications    Details   amoxicillin-clavulanate (AUGMENTIN) 500-125 MG tablet Take 1 tablet by mouth 2 times daily  Qty: 8 tablet, Refills: 0    Associated Diagnoses: Severe sepsis (H); Encephalopathy           CONTINUE these medications which have CHANGED    Details   metFORMIN (GLUCOPHAGE XR) 500 MG 24 hr tablet Take 1 tablet (500 mg) by mouth daily (with dinner)    Associated Diagnoses: Diabetes mellitus without complication (H)           CONTINUE these medications which have NOT CHANGED    Details   acetaminophen (MAPAP) 500 MG CAPS Take 2 capsules by mouth every 6 hours as needed      Emollient (CERAVE) CREA Externally apply topically 2 times daily      ketoconazole (NIZORAL) 2 % external shampoo Apply topically three times a week      SAFE-T-PRO LANCETS MISC 1 Application twice a week  Qty: 200 each, Refills: 0    Comments: Or per insurance coverage  Associated Diagnoses: Type 2 diabetes mellitus without complication, without long-term current use of insulin (H)           STOP taking these medications       aspirin (ASPIRIN 81) 81 MG chewable tablet Comments:   Reason for Stopping:         atorvastatin (LIPITOR) 20 MG tablet Comments:   Reason for Stopping:         betamethasone dipropionate (DIPROSONE) 0.05 % external ointment Comments:   Reason for Stopping:         diltiazem ER (DILT-XR) 120 MG 24 hr capsule Comments:   Reason for  Stopping:         fluocinonide (LIDEX) 0.05 % external cream Comments:   Reason for Stopping:         ibuprofen (ADVIL/MOTRIN) 400 MG tablet Comments:   Reason for Stopping:         niacinamide 500 MG tablet Comments:   Reason for Stopping:                     Consultations:   No consultations were requested during this admission          Hospital Course:   Nick Cameron is a 89 year old male came to attention from his care facility on 2/24/2024 due to concern for increased weakness, lethargy and possible fever.     PMH is notable for dementia, SCC of the scalp, DM Type 2, HTN, HLD, BPH.     On presentation to emergency department, VS: /57, HR 99, resp unlabored with O2 sat 99% in RA.  T max 99.8.  Labs: WBC 16.2, Hgb 10.6, plt 209.  Creat 1.02, Na 134, otherwise normal electrolytes. Glucose 210. Lactic acid 2.1, pro-calcitonin 0.6.  UA strongly suggestive of UTI.  Blood and urine cultures obtained.     Mr. Cameron was admitted to a medical bed after initiation of Ceftriaxone. He remained stable over the course of the sever-day hospitalization with some clearing of his sensorium.  Given the fact that cultures were negative, I could not make a firm diagnosis of acute infection, but he did improve with supportive cares and antibiotics.     Another explanation for his encephalopathy is just that he was dehydrated due to decreased oral intake that seems to go along with him spending more and more time sleeping as his dementia becomes more significant.      Over the course of the hospitalization, I did not that the pt seems to wax and wane in terms of his level of interaction. He is sometimes difficult to engage and other times appears very alert and communicative.     His children were very involved and were frequently present at the bedside. They determined that he was to remain DNR/DNI.  He was not accepted into Hospice, presumably because he does not have a prognosis of < 6 months, but they are planning to have  him reevaluated soon.    /71 (BP Location: Left arm)   Pulse 89   Temp 97.8  F (36.6  C) (Oral)   Resp 20   Wt 81.7 kg (180 lb 1.9 oz)   SpO2 98%   BMI 23.76 kg/m    At the time of discharge, Mr. Cameron is alert and pleasantly disoriented. He denies complaints, but indicates he is not hungry despite a full breakfast before him.  HEENT: no focal muscular asymmetry  Chest: No increased WOB.  CTA  COR: RRR without murmur  Abd: Soft, NTND          Discharge Instructions and Follow-Up:     Discharge diet: Regular   Discharge activity: Activity as tolerated with assist   Discharge follow-up: With PMD as needed.            Discharge Disposition:     Discharged to long-term care facility      Attestation:  I have reviewed today's vital signs, notes, medications, labs and imaging.    Gavin Gongora MD

## 2024-02-27 NOTE — PROGRESS NOTES
Care Management Discharge Note    Discharge Date: 02/27/2024       Discharge Disposition: Assisted Living    Discharge Services: None    Discharge DME: None    Discharge Transportation: agency    Private pay costs discussed: transportation costs    Patient/family educated on Medicare website which has current facility and service quality ratings: no    Education Provided on the Discharge Plan: No  Persons Notified of Discharge Plans [facility]   Patient/Family in Agreement with the Plan:      Handoff Referral Completed: No    Additional Information:  [facility] lvm for DON at [facility] P: [phone], fax: [phone]. Updated on ride time tomorrow. Sent [facility] lift order and resumption orders. [facility] cannot take patient back today due to not being able to get new medications tonight. Updated daughter on ride time.     Patient was open with [facility] PT and OT. Will need resumption orders.     VANITA Damon, LGSW  Emergency Room   Please contact the DEVON on the floor in which the patient is staying for any questions or concerns

## 2024-02-27 NOTE — PLAN OF CARE
Goal Outcome Evaluation:      Plan of Care Reviewed With: patient    Overall Patient Progress: improvingOverall Patient Progress: improving       Pt ax2. Alert to self. Incont. Tmax 101, treated with PRN tylenol and came down to 97.3. , 163 overnight. NS@10ml/hr. Plan TBD, will continue with plan of care.

## 2024-02-27 NOTE — PLAN OF CARE
Goal Outcome Evaluation:               To Do:  End of Shift Summary  For vital signs and complete assessments, please see documentation flowsheets.     Pertinent assessments: Alert to self only---up in chair with lift -- pt fed per staff- appetite fair-----inc of bowel and bladder ---insulin per scale ---  Major Shift Events appears comfortable  Treatment Plan: monitor  Bedside Nurse: Darby Gant, RN

## 2024-02-27 NOTE — PLAN OF CARE
Pertinent assessments: pt is a/confused oriented to self only.  Family visited Hospitalist updated family.  Pt up to chair for approx 3 hours pt heather well used the lift to return to bed.  Incont of bowel and bladder loose stool X2 small amounts.  IV abx.  Mag replaced today recheck in am.   Major Shift Events None    Treatment Plan:   Bedside Nurse: Mady Angeles RN

## 2024-02-27 NOTE — PROGRESS NOTES
[facility]    Medicine Progress Note - Hospitalist Service    Date of Admission:  2/24/2024    Assessment & Plan   Nick Cameron is a 89 year old male came to attention from his care facility on 2/24/2024 due to concern for increased weakness, lethargy and possible fever.    PMH is notable for dementia, SCC of the scalp, DM Type 2, HTN, HLD, BPH.    On presentation to emergency department, VS: /57, HR 99, resp unlabored with O2 sat 99% in RA.  T max 99.8.  Labs: WBC 16.2, Hgb 10.6, plt 209.  Creat 1.02, Na 134, otherwise normal electrolytes. Glucose 210. Lactic acid 2.1, pro-calcitonin 0.6.  UA strongly suggestive of UTI.  Blood and urine cultures obtained.     DX:  1.  Encephalopathy, thought to be due to infection. Although UTI was suspected, UC is significant for only mixed  flra. Blood cx NTD.  WBC improving on Ceftriaxone. SIRS v sepsis?  2.  HTN.   3.  NIDDM with hyperglycemia.   4.  Dementia, recurrent falls.    5.  Goals of care include medical support for reversible causes, per admitting provider.     PLAN:  1.  Ceftriaxone is empiric coverage for UTI. Cont while inpatient. If blood cx also are negative, will complete a week course of abx with orals.   2.  Expect discharge  to LTC tomorrow.          Diet: Combination Diet Regular Diet Adult  Diet    DVT Prophylaxis: Pneumatic Compression Devices  Tobias Catheter: Not present  Lines: None     Cardiac Monitoring: None  Code Status: No CPR- Do NOT Intubate      Clinically Significant Risk Factors            # Hypomagnesemia: Lowest Mg = 1.5 mg/dL in last 2 days, will replace as needed   # Hypoalbuminemia: Lowest albumin = 3.1 g/dL at 2/24/2024  1:43 PM, will monitor as appropriate     # Hypertension: Noted on problem list     # Dementia: noted on problem list   # DMII: A1C = 7.7 % (Ref range: <5.7 %) within past 6 months, PRESENT ON ADMISSION      # Financial/Environmental Concerns: none         Disposition Plan      Expected  Discharge Date: 02/27/2024,  3:00 PM    Destination: home with help/services     pt did not qualify for hospice today. Will be re-evaluated soon at home. Anticipate discharge to home tomorrow.          Gavin Gongora MD  Hospitalist Service  [facility]  Securely message with [employer] (more info)  Text page via [employer] Paging/Directory   ______________________________________________________________________    Interval History   Mr. Cameron had a stable night. Noted to have had a temp to 101 at about 20:30.  Otherwise, clinically unchanged. Not very responsive to me.     I spoke with daughter Radha today by phone. The pt was not accepted to Hospice at this time but he will be re-evaluated again soon. His children will discuss whether they believe the pt should be re-hospitalized in the future. [facility] is already set for DNR/DNI.      Physical Exam   Vital Signs: Temp: 98  F (36.7  C) Temp src: Oral BP: 118/80 Pulse: 81   Resp: 20 SpO2: 90 % O2 Device: None (Room air)    Weight: 180 lbs 1.85 oz    General Appearance: Alert and interactive when I went in to see him this morning.  He is very hard of hearing but was eating breakfast on his own.  I subsequently returned when the patient's son and daughter were present and he was less interactive with me at that time but seemed to be deferring to them for questions, etc.  Respiratory: no increased WOB. CTA with pt lying on his right side.   Cardiovascular: RRR without murmur  GI: soft, NTND  Skin: no obvious lesions.  Notably the patient has old Mohs surgical scars over his scalp with some nodularity where he presumably has old scar tissue.  Other:      Medical Decision Making       35 MINUTES SPENT BY ME on the date of service doing chart review, history, exam, documentation & further activities per the note.      Data   Results for orders placed or performed during the hospital encounter of 02/24/24 (from the past 24 hour(s))   Glucose by meter   Result  Value Ref Range    GLUCOSE BY METER POCT 189 (H) 70 - 99 mg/dL   Glucose by meter   Result Value Ref Range    GLUCOSE BY METER POCT 163 (H) 70 - 99 mg/dL   Glucose by meter   Result Value Ref Range    GLUCOSE BY METER POCT 178 (H) 70 - 99 mg/dL   CBC with platelets   Result Value Ref Range    WBC Count 10.9 4.0 - 11.0 10e3/uL    RBC Count 3.05 (L) 4.40 - 5.90 10e6/uL    Hemoglobin 10.3 (L) 13.3 - 17.7 g/dL    Hematocrit 30.4 (L) 40.0 - 53.0 %     78 - 100 fL    MCH 33.8 (H) 26.5 - 33.0 pg    MCHC 33.9 31.5 - 36.5 g/dL    RDW 15.5 (H) 10.0 - 15.0 %    Platelet Count 163 150 - 450 10e3/uL   Basic metabolic panel   Result Value Ref Range    Sodium 135 135 - 145 mmol/L    Potassium 4.0 3.4 - 5.3 mmol/L    Chloride 104 98 - 107 mmol/L    Carbon Dioxide (CO2) 24 22 - 29 mmol/L    Anion Gap 7 7 - 15 mmol/L    Urea Nitrogen 24.0 (H) 8.0 - 23.0 mg/dL    Creatinine 1.08 0.67 - 1.17 mg/dL    GFR Estimate 65 >60 mL/min/1.73m2    Calcium 8.2 8.2 - 9.6 mg/dL    Glucose 184 (H) 70 - 99 mg/dL   Glucose by meter   Result Value Ref Range    GLUCOSE BY METER POCT 177 (H) 70 - 99 mg/dL

## 2024-02-28 NOTE — PROGRESS NOTES
Care Management Discharge Note    Discharge Date: 02/27/2024    Discharge Disposition: Return to [facility] p:[phone] f:[phone], facility uses  LTC pharmacy    Discharge Services: None    Discharge DME: None    Discharge Transportation: [facility] w/c     Private pay costs discussed: transportation costs    Patient/Family in Agreement with the Plan:  yes    Handoff Referral Completed: No    Additional Information:  SW following for discharge planning and return to .     [facility] w/c ride is scheduled for today between 5311-1641.     Family and facility are aware of ride time.     Discharge orders faxed to [facility].     TERE Tang, LSW  Care Coordination  [phone]    TERE Okeefe

## 2024-02-28 NOTE — PLAN OF CARE
Goal Outcome Evaluation:    Pt ax2. Alert to self. Incont. , 146 overnight. NS@10ml/hr. Plan TBD, will continue with plan of care.

## 2024-02-28 NOTE — PLAN OF CARE
"Physical Therapy Discharge Summary    Reason for therapy discharge:    Discharged to home with home therapy.    Progress towards therapy goal(s). See goals on Care Plan in [facility] electronic health record for goal details.  Goals not met.  Barriers to achieving goals:   limited tolerance for therapy and discharge from facility.    Therapy recommendation(s):    Continued therapy is recommended.  Rationale/Recommendations:  Per prior recommendation, \"Patient currently needing Ax2 for stand-pivot transfer from bed to recliner chair secondary to lethargy, weakness, and deconditioning. Mobility also challenged by confusion with baseline dementia. Patient resides in memory care with his wife, is wheelchair bound at baseline. If returning home, patient would benefit from access to lift equipment to facilitate transfers as he required strong assist of two today.  PT to promote return to prior level of function\".             "

## 2024-02-29 NOTE — PROGRESS NOTES
[facility]    Background: Transitional Care Management program identified per system criteria and reviewed by [facility] team for possible outreach.    Assessment: Upon chart review, [facility] Team member will not proceed with patient outreach related to this episode of Transitional Care Management program due to reason below:    Non-[facility] TCU: [facility] team member noted patient discharged to TCU/ARU/LTACH. Patient is not established with a [facility] currently supported by Primary Care-Care Coordination therefore handoff to Primary Care-Care Coordination is not appropriate at this time.    Patient discharged back to a TCU with Memory Care. No [facility] outreach call needed at this time.     Plan: Transitional Care Management episode addressed appropriately per reason noted above.      LAURY Yusuf  [phone]  [facility]     *Connected Care Resource Team does NOT follow patient ongoing. Referrals are identified based on internal discharge reports and the outreach is to ensure patient has an understanding of their discharge instructions.

## 2024-03-07 NOTE — LETTER
"    3/7/2024        RE: Nick Cameron  [address]  [place] [address]        [facility] GERIATRICS    PRIMARY CARE PROVIDER AND CLINIC:  EDIE Villasenor CNP, [address] [place] [address]  Chief Complaint   Patient presents with     Hospital F/U      [place] Medical Record Number:  [id number]  Place of Service where encounter took place:  [facility] ([facility] [[id number]]    Nick Cameron  is a 89 year old  (7/30/1933), returned to the above facility from  [facility]. Hospital stay 2/24/24 - 2/28/24. .   HPI:      Per Epic chart review of hospital notes, discharge summary, imaging, notes, labs, medications/medication changes:     Patient with PMH DM type 2, HTN, HLD, BPH and SCC hospitalized as above for weakness and lethargy. Febrile in ED. Noted leukocytosis on lab work up and UA suggestive of UTI. Treated with Ceftriaxone. Urine and blood cultures ultimately negative though patient did clinically improve on antibiotics. Hospice was discussed but patient was deemed not to meet criteria at that time.   Patient was discharged back to [facility] ith oral Augmentin and with orders for home care, PT/OT.    VS, weights, NN reviewed in facility EMR.    Nursing reports no new concerns.    Daughter Tammy found in patient [facility] apartment. She reports patient has had a slow global decline for some time now. Not eating as well and has been losing weight. Resides in [facility] apartment with his wife. Patient is mainly w/c dependent now. Reports patient is [place] and wax/wane with level of communication and responsiveness during interaction.  Relays that patient did not qualify for hospice and reiterates goals of care are comfort. Patient found up in w/c. Alert, but minimal verbal responses to questions during visit. Did loudly provide one \"HELLO\" during interaction. NAD.     Home care nurse also present at time of NP visit and so orders given to go ahead and " "resume home care nursing and therapies. Reportedly in process of obtaining new w/c per OT assessment that was performed prior to patient's hospitalization.        CODE STATUS/ADVANCE DIRECTIVES DISCUSSION:  No CPR- Do NOT Intubate  DNR / DNI  ALLERGIES: No Known Allergies   PAST MEDICAL HISTORY:   Past Medical History:   Diagnosis Date     BPH (benign prostatic hyperplasia)      DM2 (diabetes mellitus, type 2) (H)      Gout      HLD (hyperlipidemia)      HTN (hypertension)      Malignant neoplasm of skin     [place]      PAST SURGICAL HISTORY:   has a past surgical history that includes Cataract Extraction W/ Intraocular Lens Implant; Mohs micrographic procedure; and Repair MOHS.  FAMILY HISTORY: family history includes Bone Cancer in his father; Hypertension in his father and mother; Rheumatic fever in his mother.  SOCIAL HISTORY:   reports that he has quit smoking. His smoking use included pipe and cigars. He has never used smokeless tobacco. He reports current alcohol use.  Patient's living condition: lives in an assisted living facility    Post Discharge Medication Reconciliation Status:   MED REC REQUIRED  Post Medication Reconciliation Status: discharge medications reconciled and changed, per note/orders       Current Outpatient Medications   Medication Sig     acetaminophen (MAPAP) 500 MG CAPS Take 2 capsules by mouth every 6 hours as needed     Emollient (CERAVE) CREA Externally apply topically 2 times daily     ketoconazole (NIZORAL) 2 % external shampoo Apply topically three times a week     metFORMIN (GLUCOPHAGE XR) 500 MG 24 hr tablet Take 1 tablet (500 mg) by mouth daily (with dinner)     SAFE-T-PRO LANCETS MISC 1 Application twice a week     No current facility-administered medications for this visit.       ROS:  Limited secondary to cognitive impairment but today pt reports as above    Vitals:  BP (!) 142/62   Pulse 72   Temp 97.6  F (36.4  C)   Resp 18   Ht 1.854 m (6' 1\")   Wt 88.5 kg (195 lb)   " SpO2 92%   BMI 25.73 kg/m    Exam:    GENERAL APPEARANCE: Alert, in no distress   ENT: Mouth and posterior oropharynx normal, moist mucous membranes   EYES: EOM, conjunctivae, lids, pupils and irises normal   NECK: No masses noted  RESP: respiratory effort and palpation of chest normal, Lung sound decreased, scattered crackle base right.  CV: VS as above, 1+ bilateral LE edema noted  ABDOMEN: normal bowel sounds, soft, nontender   : palpation of bladder WNL   M/S: Gait and station normal   Digits and nails normal - w/c dependent  SKIN: Inspection of skin and subcutaneous tissue baseline, Palpation of skin and subcutaneous tissue baseline  NEURO: Cranial nerves 2-12 are grossly at patient's baseline, Examination of sensation by touch normal   PSYCH: Alert,- calm, sleepy on/off  minimally verbal during visit.,          Lab/Diagnostic data:  Most Recent 3 CBC's:  Recent Labs   Lab Test 02/28/24  0710 02/27/24  0725 02/26/24  0718   WBC 9.2 10.9 12.4*  12.4*   HGB 10.8* 10.3* 10.5*  10.5*    100 100  100    163 165  165     Most Recent 3 BMP's:  Recent Labs   Lab Test 02/28/24  0909 02/28/24  0710 02/28/24  0201 02/27/24  1210 02/27/24  0725 02/26/24  0805 02/26/24  0718   NA  --  138  --   --  135  --  134*   POTASSIUM  --  4.4  --   --  4.0  --  4.1   CHLORIDE  --  105  --   --  104  --  103   CO2  --  23  --   --  24  --  22   BUN  --  24.0*  --   --  24.0*  --  17.1   CR  --  0.93  --   --  1.08  --  0.87   ANIONGAP  --  10  --   --  7  --  9   CURT  --  8.5  --   --  8.2  --  8.2   * 157* 146*   < > 184*   < > 173*    < > = values in this interval not displayed.       ASSESSMENT/PLAN:       Sepsis, due to unspecified organism, unspecified whether acute organ dysfunction present (H)   -Leukocytosis, fever, weakness- UA+, Cultures no growth. Treated with Ceftriaxone and then transitioned to oral Augmentin which will be completed 3/8. Afebrile.  - continue to monitor VS and clinical  status.    Moderate late onset Alzheimer's dementia without behavioral disturbance, psychotic disturbance, mood disturbance, or anxiety (H)  Generalized muscle weakness  Weight loss  - with slow ongoing global clinical decline. W/c dependent. Fatigued and weak. Minimally communicative. Resides in memory care [facility]. Goals of care are comfort. Family is open to hospice, patient did not meet criteria at this time.  Progressive disease, continued global decline is anticipated.    Wt Readings from Last 4 Encounters:   03/06/24 88.5 kg (195 lb)   02/24/24 81.7 kg (180 lb 1.9 oz)   02/06/24 88.5 kg (195 lb)   01/24/24 88.5 kg (195 lb)     - monitor intakes, weights.  - continue supportive cares and services in [facility]  - home care RN, MANAS, PT/OT  - new w/c pending- will follow      Diabetes mellitus, type 2 (H)  Chronic  Lab Results   Component Value Date    A1C 7.7 12/20/2023    A1C 6.5 06/28/2023     - continues on metformin 500 mg daily (decreased from 1000mg inpatient)  - annual and prn A1c    Primary hypertension  Chronic, adequately controlled  BP goal is < 150/80mmHg.  To avoid risk of hypotension, falls, dizziness, and tissue hypoperfusion.   Diltiazem discontinue inpatient.  - monitor VS/BPs  Hyperlipidemia, unspecified hyperlipidemia type  Chronic,   Statin stopped inpatient. Limit polypharmacy. Comfort care goals    History of skin cancer  Chronic, f/w derm at [place]. Per daughter patient recently to see derm and instructed to continue on Betamethasone cream to scalp BID.   - Order to resume betamethasone  - monitor skin  - f/w dermatology as directed    Orders:  Sent to unit. Discussed with patient's daughter Tammy.      Electronically signed by:  EDIE Villasenor CNP                    Sincerely,        EDIE Villasenor CNP

## 2024-03-07 NOTE — PATIENT INSTRUCTIONS
Patient: Nick Cameron     1933      ORDERS:    Resume Betamethasone DIP ointment 0.05%- apply a thin layer to ulcerated area on scalp BID for 1st half of the month ( - ), second half of month do NOT apply ointment.       EDIE Villasenor CNP on 3/7/2024 at 2:36 PM

## 2024-03-07 NOTE — PROGRESS NOTES
"[facility] GERIATRICS    PRIMARY CARE PROVIDER AND CLINIC:  EDIE Villasenor CNP, [address] [facility] [address]  Chief Complaint   Patient presents with    Hospital F/U      [place] Medical Record Number:  [id number]  Place of Service where encounter took place[facility] [[id number]]    Nick Cameron  is a 89 year old  (7/30/1933), returned to the above facility from  [facility]. Hospital stay 2/24/24 - 2/28/24. .   HPI:      Per [facility] chart review of hospital notes, discharge summary, imaging, notes, labs, medications/medication changes:     Patient with PMH DM type 2, HTN, HLD, BPH and SCC hospitalized as above for weakness and lethargy. Febrile in ED. Noted leukocytosis on lab work up and UA suggestive of UTI. Treated with Ceftriaxone. Urine and blood cultures ultimately negative though patient did clinically improve on antibiotics. Hospice was discussed but patient was deemed not to meet criteria at that time.   Patient was discharged back to [facility] ith oral Augmentin and with orders for home care, PT/OT.    VS, weights, NN reviewed in facility EMR.    Nursing reports no new concerns.    Daughter Tammy found in patient [facility] apartment. She reports patient has had a slow global decline for some time now. Not eating as well and has been losing weight. Resides in [facility] apartment with his wife. Patient is mainly w/c dependent now. Reports patient is [place] and wax/wane with level of communication and responsiveness during interaction.  Relays that patient did not qualify for hospice and reiterates goals of care are comfort. Patient found up in w/c. Alert, but minimal verbal responses to questions during visit. Did loudly provide one \"HELLO\" during interaction. NAD.     Home care nurse also present at time of NP visit and so orders given to go ahead and resume home care nursing and therapies. Reportedly in process of obtaining new " "w/c per OT assessment that was performed prior to patient's hospitalization.        CODE STATUS/ADVANCE DIRECTIVES DISCUSSION:  No CPR- Do NOT Intubate  DNR / DNI  ALLERGIES: No Known Allergies   PAST MEDICAL HISTORY:   Past Medical History:   Diagnosis Date    BPH (benign prostatic hyperplasia)     DM2 (diabetes mellitus, type 2) (H)     Gout     HLD (hyperlipidemia)     HTN (hypertension)     Malignant neoplasm of skin     [place]      PAST SURGICAL HISTORY:   has a past surgical history that includes Cataract Extraction W/ Intraocular Lens Implant; Mohs micrographic procedure; and Repair MOHS.  FAMILY HISTORY: family history includes Bone Cancer in his father; Hypertension in his father and mother; Rheumatic fever in his mother.  SOCIAL HISTORY:   reports that he has quit smoking. His smoking use included pipe and cigars. He has never used smokeless tobacco. He reports current alcohol use.  Patient's living condition: lives in an assisted living facility    Post Discharge Medication Reconciliation Status:   MED REC REQUIRED  Post Medication Reconciliation Status: discharge medications reconciled and changed, per note/orders       Current Outpatient Medications   Medication Sig    acetaminophen (MAPAP) 500 MG CAPS Take 2 capsules by mouth every 6 hours as needed    Emollient (CERAVE) CREA Externally apply topically 2 times daily    ketoconazole (NIZORAL) 2 % external shampoo Apply topically three times a week    metFORMIN (GLUCOPHAGE XR) 500 MG 24 hr tablet Take 1 tablet (500 mg) by mouth daily (with dinner)    SAFE-T-PRO LANCETS MISC 1 Application twice a week     No current facility-administered medications for this visit.       ROS:  Limited secondary to cognitive impairment but today pt reports as above    Vitals:  BP (!) 142/62   Pulse 72   Temp 97.6  F (36.4  C)   Resp 18   Ht 1.854 m (6' 1\")   Wt 88.5 kg (195 lb)   SpO2 92%   BMI 25.73 kg/m    Exam:    GENERAL APPEARANCE: Alert, in no distress   ENT: " Mouth and posterior oropharynx normal, moist mucous membranes   EYES: EOM, conjunctivae, lids, pupils and irises normal   NECK: No masses noted  RESP: respiratory effort and palpation of chest normal, Lung sound decreased, scattered crackle base right.  CV: VS as above, 1+ bilateral LE edema noted  ABDOMEN: normal bowel sounds, soft, nontender   : palpation of bladder WNL   M/S: Gait and station normal   Digits and nails normal - w/c dependent  SKIN: Inspection of skin and subcutaneous tissue baseline, Palpation of skin and subcutaneous tissue baseline  NEURO: Cranial nerves 2-12 are grossly at patient's baseline, Examination of sensation by touch normal   PSYCH: Alert,- calm, sleepy on/off  minimally verbal during visit.,          Lab/Diagnostic data:  Most Recent 3 CBC's:  Recent Labs   Lab Test 02/28/24  0710 02/27/24  0725 02/26/24  0718   WBC 9.2 10.9 12.4*  12.4*   HGB 10.8* 10.3* 10.5*  10.5*    100 100  100    163 165  165     Most Recent 3 BMP's:  Recent Labs   Lab Test 02/28/24  0909 02/28/24  0710 02/28/24  0201 02/27/24  1210 02/27/24  0725 02/26/24  0805 02/26/24  0718   NA  --  138  --   --  135  --  134*   POTASSIUM  --  4.4  --   --  4.0  --  4.1   CHLORIDE  --  105  --   --  104  --  103   CO2  --  23  --   --  24  --  22   BUN  --  24.0*  --   --  24.0*  --  17.1   CR  --  0.93  --   --  1.08  --  0.87   ANIONGAP  --  10  --   --  7  --  9   CURT  --  8.5  --   --  8.2  --  8.2   * 157* 146*   < > 184*   < > 173*    < > = values in this interval not displayed.       ASSESSMENT/PLAN:       Sepsis, due to unspecified organism, unspecified whether acute organ dysfunction present (H)   -Leukocytosis, fever, weakness- UA+, Cultures no growth. Treated with Ceftriaxone and then transitioned to oral Augmentin which will be completed 3/8. Afebrile.  - continue to monitor VS and clinical status.    Moderate late onset Alzheimer's dementia without behavioral disturbance, psychotic  disturbance, mood disturbance, or anxiety (H)  Generalized muscle weakness  Weight loss  - with slow ongoing global clinical decline. W/c dependent. Fatigued and weak. Minimally communicative. Resides in memory care [facility]. Goals of care are comfort. Family is open to hospice, patient did not meet criteria at this time.  Progressive disease, continued global decline is anticipated.    Wt Readings from Last 4 Encounters:   03/06/24 88.5 kg (195 lb)   02/24/24 81.7 kg (180 lb 1.9 oz)   02/06/24 88.5 kg (195 lb)   01/24/24 88.5 kg (195 lb)     - monitor intakes, weights.  - continue supportive cares and services in [facility]  - home care RN, HHA, PT/OT  - new w/c pending- will follow      Diabetes mellitus, type 2 (H)  Chronic  Lab Results   Component Value Date    A1C 7.7 12/20/2023    A1C 6.5 06/28/2023     - continues on metformin 500 mg daily (decreased from 1000mg inpatient)  - annual and prn A1c    Primary hypertension  Chronic, adequately controlled  BP goal is < 150/80mmHg.  To avoid risk of hypotension, falls, dizziness, and tissue hypoperfusion.   Diltiazem discontinue inpatient.  - monitor VS/BPs  Hyperlipidemia, unspecified hyperlipidemia type  Chronic,   Statin stopped inpatient. Limit polypharmacy. Comfort care goals    History of skin cancer  Chronic, f/w derm at [place]. Per daughter patient recently to see derm and instructed to continue on Betamethasone cream to scalp BID.   - Order to resume betamethasone  - monitor skin  - f/w dermatology as directed    Orders:  Sent to unit. Discussed with patient's daughter Tammy.      Electronically signed by:  EDIE Villasenor CNP

## 2024-03-25 NOTE — PROGRESS NOTES
"[facility] GERIATRICS    Chief Complaint   Patient presents with    Nursing Home Acute     HPI:  Nick Cameron is a 89 year old  (7/30/1933), who is being seen today for an episodic care visit at: [facility] ([facility]) [[id number]].     Patient seen today in memory care [facility] for recheck of following medical issues:    1. Moderate late onset Alzheimer's dementia without behavioral disturbance, psychotic disturbance, mood disturbance, or anxiety (H)    2. Physical deconditioning    3. Type 2 diabetes mellitus without complication, without long-term current use of insulin (H)    4. Primary hypertension    5. Hyperlipidemia, unspecified hyperlipidemia type    6. History of skin cancer    7. Abrasion      Nursing reports noting blister/abrasion right mid back. Uncertain how patient sustained. Is being kept clean and dry and monitored.     VS, weights, NN reviewed in facility EMR.    Patient found today in dining room having just finished eating lunch. Alert, calm, NAD. Mood is bright, smiling and laughing with others seated at his table. Reports feeling \"good\". States ate most of lunch and it was good. Denies pain, SOB, cough.     Allergies, and PMH/PSH reviewed in EPIC today.  REVIEW OF SYSTEMS:  Limited secondary to cognitive impairment but today pt reports as above    Objective:   BP (!) 142/62   Pulse 72   Temp 97.6  F (36.4  C)   Resp 18   Ht 1.854 m (6' 1\")   Wt 88.5 kg (195 lb)   SpO2 92%   BMI 25.73 kg/m      Resp: Effort WNL, LSCTA, RA  CV: VS as above, slight bilateral LE edema  Abd- soft, nontender, BS +  Musc- VICKERS- w/c- station WNL  Psych- alert, calm, pleasant      Recent labs in EPIC reviewed by me today.     Assessment/Plan:     Moderate late onset Alzheimer's dementia without behavioral disturbance, psychotic disturbance, mood disturbance, or anxiety (H)  - chronic, ongoing. Much more alert and talkative today than on previous visit immediately following " hospitalization. Resides in memory care half-way with services. Tolerating regular diet. Appetite improving.   Physical deconditioning  - continues to work with OT/PT following recent hospitalization for sepsis/? UTI. Improved energy and working on transfers/endurance. Did not qualify for hospital bed per DME company.  -  continue supportive cares and services in half-way, anticipate needs, cue for safety  - PT/OT to optimize function, safety and independence    Type 2 diabetes mellitus without complication, without long-term current use of insulin (H)  - chronic  Lab Results   Component Value Date    A1C 7.7 12/20/2023    A1C 6.5 06/28/2023   - controlled  - continue metformin  - check A1c in June again to ensure stability given hospitalization and changes in appetite/intake    Primary hypertension  Hyperlipidemia, unspecified hyperlipidemia type  - chronic, limited Bps since return. Diltiazem stopped inpatient.   - monitor VS to re-establish trend- adjust pharmacotherapy prn    History of skin cancer  - chronic. F/w derm at [place]. Daughter had requested resuming Betamethasone to scalp BID which had been stopped inpatient. This was resumed. Scalp clear today.  - monitor    Abrasion  - reportedly superficial sheering blister on right mid back. Not visualized today. No reports of pain.   - continue wash< dry, apply bacitracin and foam dressing twice weekly until healed  - monitor until healed.       Discussed plan with nursing      Electronically signed by: EDIE Villasenor CNP

## 2024-03-25 NOTE — TELEPHONE ENCOUNTER
[facility] NON-EMERGENT  ENCOUNTER    Nick Cameron is a 89 year old  (7/30/1933)    Disposition  Pt has new abrasion to medial, lower back. Pt reporting discomfort due to location of abrasion rubbing against his wheelchair. Pt does not know how wound happened; [facility] patient. Home care calling for preliminary wound care orders. PCP updated.    STAR provided to home care RN:  Cleanse w/ NS or wound cleanser and pat dry  Apply antibiotic ointment  Cover with foam dressing for protection  Change 2 x weekly and PRN    Electronically signed by:   Elsa Zepeda RN

## 2024-03-26 NOTE — LETTER
"    3/26/2024        RE: Nick Cameron  [address]  [facility] [place] [address]        [facility]    Chief Complaint   Patient presents with     Nursing Home Acute     HPI:  Nick Cameron is a 89 year old  (7/30/1933), who is being seen today for an episodic care visit at: [facility] ([facility]) [[id number]].     Patient seen today in memory care [facility] for recheck of following medical issues:    1. Moderate late onset Alzheimer's dementia without behavioral disturbance, psychotic disturbance, mood disturbance, or anxiety (H)    2. Physical deconditioning    3. Type 2 diabetes mellitus without complication, without long-term current use of insulin (H)    4. Primary hypertension    5. Hyperlipidemia, unspecified hyperlipidemia type    6. History of skin cancer    7. Abrasion      Nursing reports noting blister/abrasion right mid back. Uncertain how patient sustained. Is being kept clean and dry and monitored.     VS, weights, NN reviewed in facility EMR.    Patient found today in dining room having just finished eating lunch. Alert, calm, NAD. Mood is bright, smiling and laughing with others seated at his table. Reports feeling \"good\". States ate most of lunch and it was good. Denies pain, SOB, cough.     Allergies, and PMH/PSH reviewed in [facility] today.  REVIEW OF SYSTEMS:  Limited secondary to cognitive impairment but today pt reports as above    Objective:   BP (!) 142/62   Pulse 72   Temp 97.6  F (36.4  C)   Resp 18   Ht 1.854 m (6' 1\")   Wt 88.5 kg (195 lb)   SpO2 92%   BMI 25.73 kg/m      Resp: Effort WNL, LSCTA, RA  CV: VS as above, slight bilateral LE edema  Abd- soft, nontender, BS +  Musc- VICKERS- w/c- station WNL  Psych- alert, calm, pleasant      Recent labs in EPIC reviewed by me today.     Assessment/Plan:     Moderate late onset Alzheimer's dementia without behavioral disturbance, psychotic disturbance, mood disturbance, or anxiety (H)  - chronic, ongoing. Much more " alert and talkative today than on previous visit immediately following hospitalization. Resides in memory care longterm with services. Tolerating regular diet. Appetite improving.   Physical deconditioning  - continues to work with OT/PT following recent hospitalization for sepsis/? UTI. Improved energy and working on transfers/endurance. Did not qualify for hospital bed per DME company.  -  continue supportive cares and services in longterm, anticipate needs, cue for safety  - PT/OT to optimize function, safety and independence    Type 2 diabetes mellitus without complication, without long-term current use of insulin (H)  - chronic  Lab Results   Component Value Date    A1C 7.7 12/20/2023    A1C 6.5 06/28/2023   - controlled  - continue metformin  - check A1c in June again to ensure stability given hospitalization and changes in appetite/intake    Primary hypertension  Hyperlipidemia, unspecified hyperlipidemia type  - chronic, limited Bps since return. Diltiazem stopped inpatient.   - monitor VS to re-establish trend- adjust pharmacotherapy prn    History of skin cancer  - chronic. F/w derm at [place]. Daughter had requested resuming Betamethasone to scalp BID which had been stopped inpatient. This was resumed. Scalp clear today.  - monitor    Abrasion  - reportedly superficial sheering blister on right mid back. Not visualized today. No reports of pain.   - continue wash< dry, apply bacitracin and foam dressing twice weekly until healed  - monitor until healed.       Discussed plan with nursing      Electronically signed by: EDIE Villasenor CNP          Sincerely,        EDIE Villasenor CNP

## 2024-04-29 NOTE — TELEPHONE ENCOUNTER
[facility] NON-EMERGENT VOICEMAIL ENCOUNTER    Nick Cameron is a 89 year old  (7/30/1933), Voicemail Message received today regarding:   Unwitnessed fall    Disposition  Nurse called to report that patient had a fall over the weekend.  No injuries noted.  Patient is at baseline.  VS: 129/72, 79, 18, 97.5, and O2 95% on RA.    Requests  FYI      Electronically signed by:   Lizette Ziegler RN

## 2024-05-07 NOTE — TELEPHONE ENCOUNTER
[facility] [place] [facility] Triage Nurse Telephone Encounter    Provider: EDIE Villasenor CNP   Facility: [facility]  Facility Type:  AL    Caller: Siva  Call Back Number: [phone]    Allergies:  No Known Allergies     Reason for call: Pt had loose stools x2 last night and also today x2 so far. Not watery, just loose and not formed. Denies abdominal pain, no N/V. Eating normally, appetite is good. VS are stable.   /82 HR 78 O2 sat 96% RA Temp 97.2    Verbal Order/Direction given by Provider:   - Imodium 2mg PO daily PRN after loose stool for 3 days. Update provider if loose stools persist beyond 3 days or if pt develops other symptoms    Provider giving Order:  EDIE Villasenor CNP     Verbal Order given to: Siva Madison RN

## 2024-05-17 NOTE — TELEPHONE ENCOUNTER
[facility] T[facility] Nurse Telephone Encounter    Provider: EDIE Villasenor CNP   Facility: [facility]  Facility Type:  AL    Caller: Savanna  Call Back Number: [phone]    Allergies:  No Known Allergies     Reason for call: Family requests hospice consult/eval due to overall decline and weakness.    Verbal Order/Direction given by Provider:   - Ok for hospice eval    Provider giving Order:  EDIE Villasenor CNP     Verbal Order given to: Savanna Madison RN

## 2024-05-21 NOTE — LETTER
"    5/21/2024        RE: Nick Cameron  [address]  [facility] [place] [address]        [facility]    Chief Complaint   Patient presents with     Nursing Home Acute     HPI:  Nick Cameron is a 89 year old  (7/30/1933), who is being seen today for an episodic care visit at: [facility] ([facility]) [[id number]].     Patient seen today in memory care [facility] 2/2 nursing noted care conference with family and request for hospice referral d/t global decline with some increased confusion and decrease in verbalization.    1. Moderate late onset Alzheimer's dementia without behavioral disturbance, psychotic disturbance, mood disturbance, or anxiety (H)    2. Type 2 diabetes mellitus without complication, without long-term current use of insulin (H)    3. History of skin cancer    4. Primary hypertension      VS, weights, NN, medications/uses reviewed in facility EMR today.    Patient found in community area sitting up in w/c in community room next to spouse and watching TV. Alert, calm, NAD. Pleasant, smiles easily. Reports \"feel good\". Denies concerns currently. Denies pain. Denies SOB, cough, chest pain, dizziness. States ate breakfast and slept \"ok\". Denies n/v or abdominal discomfort.     Allergies, and PMH/PSH reviewed in EPIC today.  REVIEW OF SYSTEMS:  Limited secondary to cognitive impairment but today pt reports as above    Objective:   /72   Pulse 79   Temp 97.5  F (36.4  C)   Resp 18   Ht 1.854 m (6' 1\")   Wt 88.5 kg (195 lb)   SpO2 95%   BMI 25.73 kg/m      Resp: Effort WNL, LSCTA  CV: VS as above, 1+ bilateral LE edema  Abd- soft, nontender, BS +  Musc- VICKERS- w/c- station WNL  Psych- alert, calm, pleasant      Recent labs in EPIC reviewed by me today.     Assessment/Plan:     Moderate late onset Alzheimer's dementia without behavioral disturbance, psychotic disturbance, mood disturbance, or anxiety (H)  - chronic, resides in memory care [facility] with spouse. W/c bound. " Regular diet. Appetite/intake baseline. Participates in activities. Memory impaired, verbalization wax/wane with good and bad days. Mood and behaviors stable. No recent VS, weights documented in facility EMR but does not appear to have lost weight.  Family requesting hospice referral 2/2 reportedly noting decreased verbalization and increased confusion.   - ok for hospice referral  -  continue supportive cares and services in [facility], anticipate needs, cue for safety        Type 2 diabetes mellitus without complication, without long-term current use of insulin (H)  - chronic,   Lab Results   Component Value Date    A1C 7.7 12/20/2023    A1C 6.5 06/28/2023     - controlled. HgA1c <8% not recommended in elderly due to risk of hypoglycemia.  Risk outweighs benefit of tighter control.  Recommend no changes to current pharmacotherapy  - continue metformin 500 mg daily  - check A1c in June if does not enroll in hospice    History of skin cancer  - chronic, no new lesions noted. F/w dermatology regularly and with recommendations to continue on betamethasone topical cream.    Primary hypertension  By history- no recent VS noted in facility EMR. No noted headaches, chest pain, dizziness. And is not on pharmacotherapy        Electronically signed by: EDIE Villasenor CNP          Sincerely,        EDIE Villasenor CNP

## 2024-05-21 NOTE — PROGRESS NOTES
"[facility]IATR[facility]    Chief Complaint   Patient presents with    Nursing Home Acute     HPI:  Nick Cameron is a 89 year old  (7/30/1933), who is being seen today for an episodic care visit at: [facility] ([facility]) [[id number]].     Patient seen today in memory care [facility] 2/2 nursing noted care conference with family and request for hospice referral d/t global decline with some increased confusion and decrease in verbalization.    1. Moderate late onset Alzheimer's dementia without behavioral disturbance, psychotic disturbance, mood disturbance, or anxiety (H)    2. Type 2 diabetes mellitus without complication, without long-term current use of insulin (H)    3. History of skin cancer    4. Primary hypertension      VS, weights, NN, medications/uses reviewed in facility EMR today.    Patient found in community area sitting up in w/c in community room next to spouse and watching TV. Alert, calm, NAD. Pleasant, smiles easily. Reports \"feel good\". Denies concerns currently. Denies pain. Denies SOB, cough, chest pain, dizziness. States ate breakfast and slept \"ok\". Denies n/v or abdominal discomfort.     Allergies, and PMH/PSH reviewed in EPIC today.  REVIEW OF SYSTEMS:  Limited secondary to cognitive impairment but today pt reports as above    Objective:   /72   Pulse 79   Temp 97.5  F (36.4  C)   Resp 18   Ht 1.854 m (6' 1\")   Wt 88.5 kg (195 lb)   SpO2 95%   BMI 25.73 kg/m      Resp: Effort WNL, LSCTA  CV: VS as above, 1+ bilateral LE edema  Abd- soft, nontender, BS +  Musc- VICKERS- w/c- station WNL  Psych- alert, calm, pleasant      Recent labs in EPIC reviewed by me today.     Assessment/Plan:     Moderate late onset Alzheimer's dementia without behavioral disturbance, psychotic disturbance, mood disturbance, or anxiety (H)  - chronic, resides in memory care [facility] with spouse. W/c bound. Regular diet. Appetite/intake baseline. Participates in activities. Memory impaired, " verbalization wax/wane with good and bad days. Mood and behaviors stable. No recent VS, weights documented in facility EMR but does not appear to have lost weight.  Family requesting hospice referral 2/2 reportedly noting decreased verbalization and increased confusion.   - ok for hospice referral  -  continue supportive cares and services in [facility], anticipate needs, cue for safety        Type 2 diabetes mellitus without complication, without long-term current use of insulin (H)  - chronic,   Lab Results   Component Value Date    A1C 7.7 12/20/2023    A1C 6.5 06/28/2023     - controlled. HgA1c <8% not recommended in elderly due to risk of hypoglycemia.  Risk outweighs benefit of tighter control.  Recommend no changes to current pharmacotherapy  - continue metformin 500 mg daily  - check A1c in June if does not enroll in hospice    History of skin cancer  - chronic, no new lesions noted. F/w dermatology regularly and with recommendations to continue on betamethasone topical cream.    Primary hypertension  By history- no recent VS noted in facility EMR. No noted headaches, chest pain, dizziness. And is not on pharmacotherapy        Electronically signed by: EDIE Villasenor CNP

## 2024-06-06 NOTE — TELEPHONE ENCOUNTER
[facility] Triage Nurse Telephone Encounter    Provider: EDIE Villasenor CNP   Facility: [facility]  Facility Type:  AL    Caller: Allison  Call Back Number: [phone]    Allergies:  No Known Allergies     Reason for call: Pt continues to have episodes of diarrhea. Staff are reporting at least 2 daily and more incontinence. Imodium was effective when given previously but was only for 3 days. Vitals stable, no pain.     Verbal Order/Direction given by Provider: Imodium 2 mg po daily prn for diarrhea     Provider giving Order:  EDIE Villasenor CNP     Verbal Order given to: Rosa Maria Zepeda RN

## 2024-06-14 NOTE — LETTER
6/14/2024      Nick Cameron  [address]  [place] [address]        No notes on file      Sincerely,        Vic Padilla MD

## 2024-06-17 NOTE — TELEPHONE ENCOUNTER
"[facility] Triage Nurse Telephone Encounter    Provider: EDIE Villasenor CNP   Facility: [facility]  Facility Type:  AL    Caller: Michael    Allergies:  No Known Allergies     Reason for call: The patient sustained a\" small strawberry sized\" skin tear to the right forearm.  Nursing is reporting that they are able to approximate the edges with Steri-Strips.     Verbal Order/Direction given by Provider: Cleanse area apply Steri-Strips to the area, monitor for any signs and symptoms of infection.    Provider giving Order:  EDIE Villasenor CNP     Verbal Order given to: Michael Sidhu RN      "

## 2024-06-19 NOTE — TELEPHONE ENCOUNTER
[facility] Triage Nurse Telephone Encounter    Provider: EDIE Villasenor CNP   Facility: [facility]  Facility Type:  AL    Caller: Nancy  Call Back Number: [phone]    Allergies:  No Known Allergies     Reason for call: Pt's buttocks are reddened, moist and excoriated. Nurse is requesting order for barrier cream.    Verbal Order/Direction given by Provider:   - Calmoseptine ointment Apply to buttocks topically QID    Provider giving Order:  EDIE Villasenor CNP     Verbal Order given to: Nancy Madison RN

## 2024-08-05 NOTE — TELEPHONE ENCOUNTER
[facility] [place] [facility] Triage Nurse Telephone Encounter    Provider: EDIE Villasenor CNP   Facility: [facility]  Facility Type:  AL    Caller: Michael    Allergies:  No Known Allergies     Reason for call: Nursing is reporting that the patient sustained a skin tear measuring about 0.3cm on his rt forearm. Nursing is requesting a treatment. Steri- strips are not available.     Verbal Order/Direction given by Provider: Cleans the area with wound cleanser, apply bacitracin and a bandaid daily.     Provider giving Order:  EDIE Villasenor CNP     Verbal Order given to: Michael Sidhu RN

## 2024-09-03 NOTE — PROGRESS NOTES
[facility]  Geriatric Services    PRIMARY CARE PROVIDER AND CLINIC:      No primary care provider on file. No primary physician on file.    Vic Padilla MD FAAFP     SUBJECTIVE    Chief Complaint   Patient presents with    WILLIEECK     MD Romero YUNIER Cameron is a 89 year old  (7/30/1933),resident of    [facility] care room 10, lives with spouse    Initial/Episodic Care:    Current issues are:        (G30.1,  F02.B0) Moderate late onset Alzheimer's dementia without behavioral disturbance, psychotic disturbance, mood disturbance, or anxiety (H)  (primary encounter diagnosis)  Comment: Declining    (F03.B0) Moderate dementia without behavioral disturbance, psychotic disturbance, mood disturbance, or anxiety, unspecified dementia type (H)  Comment: Declining    (R53.81) Physical deconditioning  Comment: Declining    (R63.4) Weight loss  Comment:     (M62.81) Generalized muscle weakness  Comment:     (R26.81) Unsteady gait  Comment: Increasing falls    (R29.6) Recurrent falls  Comment: Same    (R29.6) Falls frequently  Comment: Same    (Z91.81) History of falling  Comment: Same    (E11.9) Type 2 diabetes mellitus without complication, without long-term current use of insulin (H)  Comment: Will follow    (I10) Primary hypertension  Comment: Will follow    (E78.5) Hyperlipidemia, unspecified hyperlipidemia type  Comment: Will follow    (Z85.828) History of skin cancer  Comment: Per dermatology    (H91.93) Bilateral hearing loss, unspecified hearing loss type  Comment: Follow    (Z51.81) Medication monitoring encounter  Comment: Reviewed medications      Patient's living condition: lives with spouse    Patient with increasing falls using a wheelchair/walker declining memory retired  from  with extensive history of world travel    Medications reviewed and reconciled: Yes  Medications taken as prescribed: Yes  Medications side effects noted: None  known    Current Activity/ADL Level: At baseline, decreasing memory and increasing falls    Any fall's in last year ?  Yes, multiple    Advance Directives ?  See POLST - DNR / DNI     Appetite: Good, notes food is pretty good  Sleep: Good  Bowels: No issues  Bladder: No issues  Incontinence: Uncertain    Immunizations: Reviewed    Health Maintenance    There are no preventive care reminders to display for this patient.    Current Problem List    Patient Active Problem List   Diagnosis    Benign prostatic hyperplasia without urinary obstruction    Chronic gouty arthritis    Diabetes mellitus, type 2 (H)    History of falling    History of squamous cell carcinoma of skin    Hyperlipidemia    Primary hypertension    Senile hyperkeratosis    Squamous cell carcinoma in situ (SCCIS) of skin    UTI (urinary tract infection)    Moderate late onset Alzheimer's dementia without behavioral disturbance, psychotic disturbance, mood disturbance, or anxiety (H)    Generalized muscle weakness    Weight loss    Encephalopathy    Falls frequently    Dehydration       Past Medical History    Past Medical History:   Diagnosis Date    BPH (benign prostatic hyperplasia)     DM2 (diabetes mellitus, type 2) (H)     Gout     HLD (hyperlipidemia)     HTN (hypertension)     Malignant neoplasm of skin     [place]       Past Surgical History    Past Surgical History:   Procedure Laterality Date    CATARACT EXTRACTION W/ INTRAOCULAR LENS IMPLANT      MOHS MICROGRAPHIC PROCEDURE      REPAIR MOHS         Current Medications    Current Outpatient Medications   Medication Sig Dispense Refill    acetaminophen (MAPAP) 500 MG CAPS Take 2 capsules by mouth every 6 hours as needed      betamethasone dipropionate (DIPROSONE) 0.05 % external lotion Apply topically 2 times daily On 2 weeks, off 2 weeks.      Emollient (CERAVE) CREA Externally apply topically 2 times daily      ketoconazole (NIZORAL) 2 % external shampoo Apply topically three times a week       loperamide (IMODIUM) 2 MG capsule Take 2 mg by mouth daily as needed for diarrhea      menthol-zinc oxide (CALMOSEPTINE) 0.44-20.6 % OINT ointment Apply topically 4 times daily 113 g 11    metFORMIN (GLUCOPHAGE XR) 500 MG 24 hr tablet Take 1 tablet (500 mg) by mouth daily (with dinner)      SAFE-T-PRO LANCETS MISC 1 Application twice a week 200 each 0       Allergies    No Known Allergies    Immunizations    Immunization History   Administered Date(s) Administered    COVID-19 12+ (2023-24) (Pfizer) 10/11/2023    COVID-19 Bivalent 18+ (Moderna) 11/02/2022    COVID-19 MONOVALENT 12+ (Pfizer) 01/19/2021, 02/09/2021, 11/05/2021    COVID-19 Monovalent 18+ (Moderna) 04/27/2022    Flu, Unspecified 10/28/2008    Influenza (High Dose) 3 valent vaccine 10/15/2011, 10/27/2012, 11/04/2013, 10/25/2015, 11/04/2017, 10/19/2018, 10/29/2019    Influenza (IIV3) PF 12/30/2002, 11/01/2006, 10/28/2008, 10/18/2011, 10/15/2014    Influenza Vaccine 65+ (FLUAD) 11/02/2022    Influenza Vaccine 65+ (Fluzone HD) 10/14/2020, 11/05/2021, 10/11/2023    Influenza Vaccine IM Ages 6-35 Months 4 Valent (PF) 11/01/2006, 11/01/2007, 10/17/2012    Influenza, seasonal, injectable, PF 11/01/2007, 09/25/2009, 09/28/2010    Pneumo Conj 13-V (2010&after) 10/25/2015    Pneumococcal 20 valent Conjugate (Prevnar 20) 12/19/2023    Pneumococcal 23 valent 04/28/1997, 10/19/2018    TD,PF 7+ (Tenivac) 05/02/2007, 10/19/2018    TDAP (Adacel,Boostrix) 05/02/2007    Td (Adult), Adsorbed 04/28/1997, 05/02/2007    Tdap (Adult) Unspecified Formulation 04/28/1997    Zoster recombinant adjuvanted (SHINGRIX) 11/08/2019, 10/14/2020       Family History    Family History   Problem Relation Age of Onset    Hypertension Mother     Rheumatic fever Mother     Bone Cancer Father     Hypertension Father        Social History    Social History     Socioeconomic History    Marital status:      Spouse name: Not on file    Number of children: Not on file    Years of education:  Not on file    Highest education level: Not on file   Occupational History    Not on file   Tobacco Use    Smoking status: Former     Types: Pipe, Cigars    Smokeless tobacco: Never    Tobacco comments:     Quit 1970 to 1980   Substance and Sexual Activity    Alcohol use: Yes     Comment: Limited    Drug use: Not on file    Sexual activity: Not on file   Other Topics Concern    Not on file   Social History Narrative    Not on file     Social Determinants of Health     Financial Resource Strain: Low Risk  (10/11/2020)    Received from [facility]    Overall Financial Resource Strain (CARDIA)     Difficulty of Paying Living Expenses: Not hard at all   Food Insecurity: No Food Insecurity (4/22/2024)    Received from [facility]    Hunger Vital Sign     Worried About Running Out of Food in the Last Year: Never true     Ran Out of Food in the Last Year: Never true   Transportation Needs: No Transportation Needs (4/22/2024)    Received from [facility]    PRAPARE - Transportation     Lack of Transportation (Medical): No     Lack of Transportation (Non-Medical): No   Physical Activity: Inactive (4/22/2024)    Received from [facility]    Exercise Vital Sign     Days of Exercise per Week: 0 days     Minutes of Exercise per Session: 0 min   Stress: No Stress Concern Present (3/26/2023)    Received from [facility]    [place] of Occupational Health - Occupational Stress Questionnaire     Feeling of Stress : Not at all   Social Connections: Unknown (3/26/2023)    Received from [facility]    Social Connection and Isolation Panel [NHANES]     Frequency of Communication with Friends and Family: More than three times a week     Frequency of Social Gatherings with Friends and Family: More than three times a week     Attends Protestant Services: Not on file     Active Member of Clubs or Organizations: No     Attends Club or Organization Meetings: Not on file     Marital Status: Not on  "file   Interpersonal Safety: Not on file   Housing Stability: Low Risk  (4/22/2024)    Received from [facility]    Housing Stability     What is your living situation today?: I have a steady place to live       All above reviewed and updated, all stable unless otherwise noted    Recent labs reviewed    ROS    As above, otherwise negative    OBJECTIVE    /72   Pulse 79   Temp 97.5  F (36.4  C)   Resp 18   Ht 1.854 m (6' 1\")   Wt 88.5 kg (195 lb)   SpO2 95%   BMI 25.73 kg/m    Body mass index is 25.73 kg/m .    BP Readings from Last 3 Encounters:   06/14/24 129/72   05/21/24 129/72   03/25/24 (!) 142/62       Wt Readings from Last 4 Encounters:   06/14/24 88.5 kg (195 lb)   05/21/24 88.5 kg (195 lb)   03/25/24 88.5 kg (195 lb)   03/06/24 88.5 kg (195 lb)       GENERAL: healthy, alert and no distress  EYES: Eyes grossly normal to inspection, extraocular movements - intact, and PERRL  NECK: no tenderness, no adenopathy, no asymmetry, no masses, no stiffness; thyroid- normal to palpation  RESP: lungs clear to auscultation - no rales, no rhonchi, no wheezes  CV: regular rates and rhythm, normal S1 S2, no S3 or S4 and no murmur, no click or rub -  ABDOMEN: soft, no tenderness, no  hepatosplenomegaly, no masses, normal bowel sounds  MS: extremities- no gross deformities noted, no edema  SKIN: no suspicious lesions, no rashes  NEURO: At baseline declining memory, increasing falls  PSYCH: As above, was able to share stories of worldwide travel as an  from [employer]    DIAGNOSTICS/PROCEDURES    Lab/Diagnostic data:    WBC Count   Date Value Ref Range Status   02/28/2024 9.2 4.0 - 11.0 10e3/uL Final   ]    Hemoglobin   Date Value Ref Range Status   02/28/2024 10.8 (L) 13.3 - 17.7 g/dL Final   02/27/2024 10.3 (L) 13.3 - 17.7 g/dL Final   ]    Last Basic Metabolic Panel:    Lab Results   Component Value Date     02/28/2024      Lab Results   Component Value Date    POTASSIUM 4.4 02/28/2024     Lab Results "   Component Value Date    CURT 8.5 02/28/2024     Lab Results   Component Value Date    CO2 23 02/28/2024       Lab Results   Component Value Date    BUN 24.0 02/28/2024     Lab Results   Component Value Date    CR 0.93 02/28/2024       Lab Results   Component Value Date     02/28/2024     02/28/2024       Recent labs reviewed     ASSESSMENT      ICD-10-CM    1. Moderate late onset Alzheimer's dementia without behavioral disturbance, psychotic disturbance, mood disturbance, or anxiety (H)  G30.1     F02.B0       2. Moderate dementia without behavioral disturbance, psychotic disturbance, mood disturbance, or anxiety, unspecified dementia type (H)  F03.B0       3. Physical deconditioning  R53.81       4. Weight loss  R63.4       5. Generalized muscle weakness  M62.81       6. Unsteady gait  R26.81       7. Recurrent falls  R29.6       8. Falls frequently  R29.6       9. History of falling  Z91.81       10. Type 2 diabetes mellitus without complication, without long-term current use of insulin (H)  E11.9       11. Primary hypertension  I10       12. Hyperlipidemia, unspecified hyperlipidemia type  E78.5       13. History of skin cancer  Z85.828       14. Bilateral hearing loss, unspecified hearing loss type  H91.93       15. Medication monitoring encounter  Z51.81           PLAN    Discussed treatment/modality options, including risk and benefits, he desires:     Orders:    Continue current cares    All diagnosis above reviewed and noted above, otherwise stable.  See [facility] orders for further details.      Information reviewed:  Medications, vital signs, orders, nursing notes, problem list, hospital information.     Facility MDS and care plan reviewed.    Total time spent with patient visit was 40 minutes including patient visit and review of past records. Greater than 50% of total time spent with counseling and coordinating care.    Follow up in 4-6  month(s) and as needed.    FACE TO  FACE    Documentation of Face to Face and Certification for Home Health Services    I certify that patient: Nick Cameron is under my care and that I, or a nurse practitioner or physician's assistant working with me, had a face-to-face encounter that meets the physician face-to-face encounter requirements with this patient on: 6/14/2024.    This encounter with the patient was in whole, or in part, for the following medical condition, which is the primary reason for home health care: See above.    I certify that, based on my findings, the following services are medically necessary home health services:  Per memory care assessment .    My clinical findings support the need for the above services because:  See above    Further, I certify that my clinical findings support that this patient is homebound (i.e. absences from home require considerable and taxing effort and are for medical reasons or Judaism services or infrequently or of short duration when for other reasons) because:  Fall and safety concerns .    Based on the above findings. I certify that this patient is confined to the home and needs intermittent skilled nursing care, physical therapy and/or speech therapy.  The patient is under my care, and I have initiated the establishment of the plan of care.  This patient will be followed by a physician who will periodically review the plan of care.  Physician/Provider to provide follow up care: No primary care provider on file.    Attending hospital physician (the Medicare certified [place] provider): Vic Padilla MD  Physician Signature: See electronic signature associated with these discharge orders.  Date: 9/2/2024           Vic Padilla MD, FAAFP     [facility]  [address] [place] [id number]  [email or web address]   Office: [phone]  Fax: [phone]  Pager: [phone]